# Patient Record
Sex: MALE | Race: ASIAN | NOT HISPANIC OR LATINO | URBAN - METROPOLITAN AREA
[De-identification: names, ages, dates, MRNs, and addresses within clinical notes are randomized per-mention and may not be internally consistent; named-entity substitution may affect disease eponyms.]

---

## 2019-03-23 ENCOUNTER — INPATIENT (INPATIENT)
Facility: HOSPITAL | Age: 64
LOS: 2 days | Discharge: HOME | End: 2019-03-26
Attending: HOSPITALIST | Admitting: HOSPITALIST
Payer: MEDICAID

## 2019-03-23 VITALS
TEMPERATURE: 99 F | SYSTOLIC BLOOD PRESSURE: 157 MMHG | HEART RATE: 86 BPM | OXYGEN SATURATION: 100 % | DIASTOLIC BLOOD PRESSURE: 90 MMHG | RESPIRATION RATE: 16 BRPM

## 2019-03-23 DIAGNOSIS — Z79.899 OTHER LONG TERM (CURRENT) DRUG THERAPY: ICD-10-CM

## 2019-03-23 DIAGNOSIS — R74.0 NONSPECIFIC ELEVATION OF LEVELS OF TRANSAMINASE AND LACTIC ACID DEHYDROGENASE [LDH]: ICD-10-CM

## 2019-03-23 DIAGNOSIS — M54.9 DORSALGIA, UNSPECIFIED: ICD-10-CM

## 2019-03-23 DIAGNOSIS — E83.52 HYPERCALCEMIA: ICD-10-CM

## 2019-03-23 DIAGNOSIS — I10 ESSENTIAL (PRIMARY) HYPERTENSION: ICD-10-CM

## 2019-03-23 DIAGNOSIS — Z29.9 ENCOUNTER FOR PROPHYLACTIC MEASURES, UNSPECIFIED: ICD-10-CM

## 2019-03-23 DIAGNOSIS — E78.5 HYPERLIPIDEMIA, UNSPECIFIED: ICD-10-CM

## 2019-03-23 DIAGNOSIS — E11.9 TYPE 2 DIABETES MELLITUS WITHOUT COMPLICATIONS: ICD-10-CM

## 2019-03-23 LAB
ALBUMIN SERPL ELPH-MCNC: 4.2 G/DL — SIGNIFICANT CHANGE UP (ref 3.3–5)
ALP SERPL-CCNC: 59 U/L — SIGNIFICANT CHANGE UP (ref 40–120)
ALT FLD-CCNC: 46 U/L — HIGH (ref 4–41)
ANION GAP SERPL CALC-SCNC: 14 MMO/L — SIGNIFICANT CHANGE UP (ref 7–14)
APPEARANCE UR: CLEAR — SIGNIFICANT CHANGE UP
AST SERPL-CCNC: 29 U/L — SIGNIFICANT CHANGE UP (ref 4–40)
BASOPHILS # BLD AUTO: 0.03 K/UL — SIGNIFICANT CHANGE UP (ref 0–0.2)
BASOPHILS NFR BLD AUTO: 0.4 % — SIGNIFICANT CHANGE UP (ref 0–2)
BILIRUB SERPL-MCNC: 0.2 MG/DL — SIGNIFICANT CHANGE UP (ref 0.2–1.2)
BILIRUB UR-MCNC: NEGATIVE — SIGNIFICANT CHANGE UP
BLOOD UR QL VISUAL: NEGATIVE — SIGNIFICANT CHANGE UP
BUN SERPL-MCNC: 18 MG/DL — SIGNIFICANT CHANGE UP (ref 7–23)
CALCIUM SERPL-MCNC: 10.9 MG/DL — HIGH (ref 8.4–10.5)
CHLORIDE SERPL-SCNC: 101 MMOL/L — SIGNIFICANT CHANGE UP (ref 98–107)
CO2 SERPL-SCNC: 22 MMOL/L — SIGNIFICANT CHANGE UP (ref 22–31)
COLOR SPEC: SIGNIFICANT CHANGE UP
CREAT SERPL-MCNC: 1.02 MG/DL — SIGNIFICANT CHANGE UP (ref 0.5–1.3)
EOSINOPHIL # BLD AUTO: 0.16 K/UL — SIGNIFICANT CHANGE UP (ref 0–0.5)
EOSINOPHIL NFR BLD AUTO: 2.3 % — SIGNIFICANT CHANGE UP (ref 0–6)
GLUCOSE BLDC GLUCOMTR-MCNC: 180 MG/DL — HIGH (ref 70–99)
GLUCOSE SERPL-MCNC: 185 MG/DL — HIGH (ref 70–99)
GLUCOSE UR-MCNC: >1000 — HIGH
HCT VFR BLD CALC: 44.6 % — SIGNIFICANT CHANGE UP (ref 39–50)
HGB BLD-MCNC: 14.1 G/DL — SIGNIFICANT CHANGE UP (ref 13–17)
IMM GRANULOCYTES NFR BLD AUTO: 0.1 % — SIGNIFICANT CHANGE UP (ref 0–1.5)
KETONES UR-MCNC: NEGATIVE — SIGNIFICANT CHANGE UP
LEUKOCYTE ESTERASE UR-ACNC: NEGATIVE — SIGNIFICANT CHANGE UP
LYMPHOCYTES # BLD AUTO: 1.43 K/UL — SIGNIFICANT CHANGE UP (ref 1–3.3)
LYMPHOCYTES # BLD AUTO: 21 % — SIGNIFICANT CHANGE UP (ref 13–44)
MCHC RBC-ENTMCNC: 29.1 PG — SIGNIFICANT CHANGE UP (ref 27–34)
MCHC RBC-ENTMCNC: 31.6 % — LOW (ref 32–36)
MCV RBC AUTO: 92.1 FL — SIGNIFICANT CHANGE UP (ref 80–100)
MONOCYTES # BLD AUTO: 0.58 K/UL — SIGNIFICANT CHANGE UP (ref 0–0.9)
MONOCYTES NFR BLD AUTO: 8.5 % — SIGNIFICANT CHANGE UP (ref 2–14)
NEUTROPHILS # BLD AUTO: 4.6 K/UL — SIGNIFICANT CHANGE UP (ref 1.8–7.4)
NEUTROPHILS NFR BLD AUTO: 67.7 % — SIGNIFICANT CHANGE UP (ref 43–77)
NITRITE UR-MCNC: NEGATIVE — SIGNIFICANT CHANGE UP
NRBC # FLD: 0 K/UL — LOW (ref 25–125)
PH UR: 7 — SIGNIFICANT CHANGE UP (ref 5–8)
PLATELET # BLD AUTO: 297 K/UL — SIGNIFICANT CHANGE UP (ref 150–400)
PMV BLD: 11.2 FL — SIGNIFICANT CHANGE UP (ref 7–13)
POTASSIUM SERPL-MCNC: 4 MMOL/L — SIGNIFICANT CHANGE UP (ref 3.5–5.3)
POTASSIUM SERPL-SCNC: 4 MMOL/L — SIGNIFICANT CHANGE UP (ref 3.5–5.3)
PROT SERPL-MCNC: 7 G/DL — SIGNIFICANT CHANGE UP (ref 6–8.3)
PROT UR-MCNC: NEGATIVE — SIGNIFICANT CHANGE UP
RBC # BLD: 4.84 M/UL — SIGNIFICANT CHANGE UP (ref 4.2–5.8)
RBC # FLD: 12.9 % — SIGNIFICANT CHANGE UP (ref 10.3–14.5)
SODIUM SERPL-SCNC: 137 MMOL/L — SIGNIFICANT CHANGE UP (ref 135–145)
SP GR SPEC: 1.02 — SIGNIFICANT CHANGE UP (ref 1–1.04)
UROBILINOGEN FLD QL: NORMAL — SIGNIFICANT CHANGE UP
WBC # BLD: 6.81 K/UL — SIGNIFICANT CHANGE UP (ref 3.8–10.5)
WBC # FLD AUTO: 6.81 K/UL — SIGNIFICANT CHANGE UP (ref 3.8–10.5)

## 2019-03-23 PROCEDURE — 72131 CT LUMBAR SPINE W/O DYE: CPT | Mod: 26

## 2019-03-23 PROCEDURE — 76376 3D RENDER W/INTRP POSTPROCES: CPT | Mod: 26

## 2019-03-23 PROCEDURE — 72192 CT PELVIS W/O DYE: CPT | Mod: 26

## 2019-03-23 RX ORDER — MORPHINE SULFATE 50 MG/1
2 CAPSULE, EXTENDED RELEASE ORAL EVERY 6 HOURS
Qty: 0 | Refills: 0 | Status: DISCONTINUED | OUTPATIENT
Start: 2019-03-23 | End: 2019-03-24

## 2019-03-23 RX ORDER — ACETAMINOPHEN 500 MG
975 TABLET ORAL ONCE
Qty: 0 | Refills: 0 | Status: COMPLETED | OUTPATIENT
Start: 2019-03-23 | End: 2019-03-23

## 2019-03-23 RX ORDER — LOSARTAN POTASSIUM 100 MG/1
50 TABLET, FILM COATED ORAL DAILY
Qty: 0 | Refills: 0 | Status: DISCONTINUED | OUTPATIENT
Start: 2019-03-23 | End: 2019-03-25

## 2019-03-23 RX ORDER — KETOROLAC TROMETHAMINE 30 MG/ML
60 SYRINGE (ML) INJECTION ONCE
Qty: 0 | Refills: 0 | Status: DISCONTINUED | OUTPATIENT
Start: 2019-03-23 | End: 2019-03-23

## 2019-03-23 RX ORDER — MORPHINE SULFATE 50 MG/1
4 CAPSULE, EXTENDED RELEASE ORAL EVERY 6 HOURS
Qty: 0 | Refills: 0 | Status: DISCONTINUED | OUTPATIENT
Start: 2019-03-23 | End: 2019-03-24

## 2019-03-23 RX ORDER — DEXTROSE 50 % IN WATER 50 %
12.5 SYRINGE (ML) INTRAVENOUS ONCE
Qty: 0 | Refills: 0 | Status: DISCONTINUED | OUTPATIENT
Start: 2019-03-23 | End: 2019-03-26

## 2019-03-23 RX ORDER — SODIUM CHLORIDE 9 MG/ML
1000 INJECTION, SOLUTION INTRAVENOUS
Qty: 0 | Refills: 0 | Status: DISCONTINUED | OUTPATIENT
Start: 2019-03-23 | End: 2019-03-25

## 2019-03-23 RX ORDER — DEXTROSE 50 % IN WATER 50 %
15 SYRINGE (ML) INTRAVENOUS ONCE
Qty: 0 | Refills: 0 | Status: DISCONTINUED | OUTPATIENT
Start: 2019-03-23 | End: 2019-03-26

## 2019-03-23 RX ORDER — LIDOCAINE 4 G/100G
1 CREAM TOPICAL ONCE
Qty: 0 | Refills: 0 | Status: COMPLETED | OUTPATIENT
Start: 2019-03-23 | End: 2019-03-23

## 2019-03-23 RX ORDER — ENOXAPARIN SODIUM 100 MG/ML
40 INJECTION SUBCUTANEOUS DAILY
Qty: 0 | Refills: 0 | Status: DISCONTINUED | OUTPATIENT
Start: 2019-03-23 | End: 2019-03-23

## 2019-03-23 RX ORDER — INSULIN LISPRO 100/ML
VIAL (ML) SUBCUTANEOUS
Qty: 0 | Refills: 0 | Status: DISCONTINUED | OUTPATIENT
Start: 2019-03-23 | End: 2019-03-26

## 2019-03-23 RX ORDER — INSULIN LISPRO 100/ML
VIAL (ML) SUBCUTANEOUS AT BEDTIME
Qty: 0 | Refills: 0 | Status: DISCONTINUED | OUTPATIENT
Start: 2019-03-23 | End: 2019-03-26

## 2019-03-23 RX ORDER — MORPHINE SULFATE 50 MG/1
4 CAPSULE, EXTENDED RELEASE ORAL ONCE
Qty: 0 | Refills: 0 | Status: DISCONTINUED | OUTPATIENT
Start: 2019-03-23 | End: 2019-03-23

## 2019-03-23 RX ORDER — MORPHINE SULFATE 50 MG/1
2 CAPSULE, EXTENDED RELEASE ORAL ONCE
Qty: 0 | Refills: 0 | Status: DISCONTINUED | OUTPATIENT
Start: 2019-03-23 | End: 2019-03-23

## 2019-03-23 RX ORDER — GLUCAGON INJECTION, SOLUTION 0.5 MG/.1ML
1 INJECTION, SOLUTION SUBCUTANEOUS ONCE
Qty: 0 | Refills: 0 | Status: DISCONTINUED | OUTPATIENT
Start: 2019-03-23 | End: 2019-03-26

## 2019-03-23 RX ORDER — DEXTROSE 50 % IN WATER 50 %
25 SYRINGE (ML) INTRAVENOUS ONCE
Qty: 0 | Refills: 0 | Status: DISCONTINUED | OUTPATIENT
Start: 2019-03-23 | End: 2019-03-26

## 2019-03-23 RX ADMIN — MORPHINE SULFATE 4 MILLIGRAM(S): 50 CAPSULE, EXTENDED RELEASE ORAL at 19:41

## 2019-03-23 RX ADMIN — MORPHINE SULFATE 2 MILLIGRAM(S): 50 CAPSULE, EXTENDED RELEASE ORAL at 22:22

## 2019-03-23 RX ADMIN — MORPHINE SULFATE 2 MILLIGRAM(S): 50 CAPSULE, EXTENDED RELEASE ORAL at 22:40

## 2019-03-23 RX ADMIN — LIDOCAINE 1 PATCH: 4 CREAM TOPICAL at 14:28

## 2019-03-23 RX ADMIN — Medication 60 MILLIGRAM(S): at 14:28

## 2019-03-23 RX ADMIN — Medication 975 MILLIGRAM(S): at 14:28

## 2019-03-23 NOTE — ED PROVIDER NOTE - OBJECTIVE STATEMENT
62 yo M hx HTN, HLD, DM2, sciatica, presenting with acute on chronic back and leg pain. Mechanial fall one week ago, while crossing the street, no LOC. 64 yo M hx HTN, HLD, DM2, sciatica, presenting with acute on chronic back and leg pain. Mechanial fall one week ago, while crossing the street, no LOC. Fell onto L knee and then onto backside. Did not hit head. Unable to sit or walk 2/2 pain. Denies bowel/bladder incontinence. no urinary retention. Taking Tylenol once a day without relief. took his wife's 100 mg gabapentin twice yesterday without relief.

## 2019-03-23 NOTE — H&P ADULT - NSHPLABSRESULTS_GEN_ALL_CORE
14.1   6.81  )-----------( 297      ( 23 Mar 2019 19:07 )             44.6           137  |  101  |  18  ----------------------------<  185<H>  4.0   |  22  |  1.02    Ca    10.9<H>      23 Mar 2019 19:07    TPro  7.0  /  Alb  4.2  /  TBili  0.2  /  DBili  x   /  AST  29  /  ALT  46<H>  /  AlkPhos  59                Urinalysis Basic - ( 23 Mar 2019 18:54 )    Color: LIGHT YELLOW / Appearance: CLEAR / S.024 / pH: 7.0  Gluc: >1000 / Ketone: NEGATIVE  / Bili: NEGATIVE / Urobili: NORMAL   Blood: NEGATIVE / Protein: NEGATIVE / Nitrite: NEGATIVE   Leuk Esterase: NEGATIVE / RBC: x / WBC x   Sq Epi: x / Non Sq Epi: x / Bacteria: x      < from: CT Lumbar Spine No Cont (19 @ 16:04) >      FINDINGS:      VERTEBRAL BODIES AND DISCS: No acute fracture. There are multilevel   degenerative changes characterized by disc bulges, ligament hypertrophy,   and facet and uncinate hypertrophy. This results in multilevel spinal   canal stenosis and multilevel neural foraminal narrowing, the overall   degree of which is not well demonstrated on this study. Anterior   osteophytes are noted.    ALIGNMENT:  No subluxations.    SPINAL CANAL:  No other intradural or extradural defects are seen.     MISCELLANEOUS:  Nonspecific distention of the urinary bladder is   partially visualized. Correlate for lack of recent micturition, bladder   outlet obstruction, or neurogenic bladder. Aortic calcifications.      IMPRESSION:     No acute fracture. Multilevel degenerative changes, as above. If the   patient has persistent back pain, consider lumbar spine MRI imaging   follow-up.    Nonspecific distention of the urinary bladder is partially visualized.   Correlate for lack of recent micturition, bladder outlet obstruction, or   neurogenic bladder    < end of copied text >    < from: CT Pelvis Bony Only No Cont (19 @ 16:04) >    BONE: No acute fracture or dislocation. Extensive enthesopathy about the   pelvis and bilateral greater trochanters. Partial ankylosis of the   bilateral SI joints. Multilevel spondylosis and facet arthropathy in the   lower lumbar spine. Degenerative change of the pubic symphysis. Bilateral   hip cartilage space narrowing with large marginal osteophyte formation.   No focal lytic orblastic lesions.    SOFT TISSUE: Neurovascular structures are normal in course and caliber.   Intrapelvic organs are within normal limits. No pelvic free fluid or   lymphadenopathy. Left-sided fat-containing inguinal hernia. Muscle bulk   is symmetric and maintained. No hip joint effusions.    IMPRESSION:  1.  No acute fracture or dislocation.  2.  Osteoarthritis of the bilateral hips.  3.  Extensive enthesopathy about the pelvis and partial ankylosis of the   bilateral SI joints, raising the possibility of ankylosing spondylitis.              < end of copied text > 14.1   6.81  )-----------( 297      ( 23 Mar 2019 19:07 )             44.6         137  |  101  |  18  ----------------------------<  185<H>  4.0   |  22  |  1.02    Ca    10.9<H>      23 Mar 2019 19:07    TPro  7.0  /  Alb  4.2  /  TBili  0.2  /  DBili  x   /  AST  29  /  ALT  46<H>  /  AlkPhos  59          Urinalysis Basic - ( 23 Mar 2019 18:54 )  Color: LIGHT YELLOW / Appearance: CLEAR / S.024 / pH: 7.0  Gluc: >1000 / Ketone: NEGATIVE  / Bili: NEGATIVE / Urobili: NORMAL   Blood: NEGATIVE / Protein: NEGATIVE / Nitrite: NEGATIVE   Leuk Esterase: NEGATIVE / RBC: x / WBC x   Sq Epi: x / Non Sq Epi: x / Bacteria: x    < from: CT Lumbar Spine No Cont (19 @ 16:04) >  FINDINGS:    VERTEBRAL BODIES AND DISCS: No acute fracture. There are multilevel degenerative changes characterized by disc bulges, ligament hypertrophy, and facet and uncinate hypertrophy. This results in multilevel spinal canal stenosis and multilevel neural foraminal narrowing, the overall degree of which is not well demonstrated on this study. Anterior osteophytes are noted.  ALIGNMENT:  No subluxations.  SPINAL CANAL:  No other intradural or extradural defects are seen.   MISCELLANEOUS:  Nonspecific distention of the urinary bladder is partially visualized. Correlate for lack of recent micturition, bladder   outlet obstruction, or neurogenic bladder. Aortic calcifications.  IMPRESSION: No acute fracture. Multilevel degenerative changes, as above. If the patient has persistent back pain, consider lumbar spine MRI imaging follow-up. Nonspecific distention of the urinary bladder is partially visualized. Correlate for lack of recent micturition, bladder outlet obstruction, or neurogenic bladder   < end of copied text >    < from: CT Pelvis Bony Only No Cont (19 @ 16:04) >  BONE: No acute fracture or dislocation. Extensive enthesopathy about the pelvis and bilateral greater trochanters. Partial ankylosis of the   bilateral SI joints. Multilevel spondylosis and facet arthropathy in the lower lumbar spine. Degenerative change of the pubic symphysis. Bilateral   hip cartilage space narrowing with large marginal osteophyte formation. No focal lytic or blastic lesions.  SOFT TISSUE: Neurovascular structures are normal in course and caliber. Intrapelvic organs are within normal limits. No pelvic free fluid or   lymphadenopathy. Left-sided fat-containing inguinal hernia. Muscle bulk is symmetric and maintained. No hip joint effusions.  IMPRESSION: 1.  No acute fracture or dislocation. 2.  Osteoarthritis of the bilateral hips. 3.  Extensive enthesopathy about the pelvis and partial ankylosis of the bilateral SI joints, raising the possibility of ankylosing spondylitis.   < end of copied text >

## 2019-03-23 NOTE — ED ADULT NURSE REASSESSMENT NOTE - REASSESS COMMUNICATION
bed in 313b, floor called [id number]
MAR and floor resident made aware of vs and pain. will medicate prior to sending pt to floor as ordered for pain/ED physician notified

## 2019-03-23 NOTE — H&P ADULT - ATTENDING COMMENTS
Agree with resident H&P and plan as edited above. Patient reports to me similar pain for >1mo, has been to PT without relief of symptoms as well as  having outpatient imaging ?MRI (wife will bring in report in AM) and has had an injection in the lower back approximately 1 month ago.  Patient reports mechanical fall 1 week ago without trauma to back/head trauma or LOC (fell on knees with notable healing abrasion to b/l knees).  Pain to palpation of sacral spine on my exam, patient with full ROM b/l legs, (+)straight leg raise and strength in LLE limited secondary to pain.  Denies bowel/bladder incontinence or saddle anesthesia. Reports passing urine since earlier imaging studies, bladder does not feel distended on my exam. Would benefit from clarification of outpatient w/u and treatment thus far.  Per OMR review patient received Cyclobenzaprine 10mg Rx on 3/19 and also received Rx in 1/2019 which would be consistent with more of an acute on chronic process. Will add on HLAB-27 to AM labs.  F/u inflammatory markers.  May benefit from trial of steroids. Agree with resident H&P and plan as edited above. Patient reports to me similar pain for >1mo, has been to PT without relief of symptoms as well as  having outpatient imaging ?MRI (wife will bring in report in AM) and has had an injection in the lower back approximately 1 month ago.  Patient reports mechanical fall 1 week ago without trauma to back/head trauma or LOC (fell on knees with notable healing abrasion to b/l knees).  Pain to palpation of sacral spine on my exam, patient with full ROM b/l legs, (+)straight leg raise and strength in LLE limited secondary to pain.  Reports dorsal numbness lateral 3 toes on L that comes/goes for also >1mo.  Denies bowel/bladder incontinence or saddle anesthesia. Reports passing urine since earlier imaging studies, bladder does not feel distended on my exam. Would benefit from clarification of outpatient w/u and treatment thus far.  Per OMR review patient received Cyclobenzaprine 10mg Rx on 3/19 and also received Rx in 1/2019 which would be consistent with more of an acute on chronic process. Will add on HLAB-27 to AM labs.  F/u inflammatory markers.  May benefit from trial of steroids.

## 2019-03-23 NOTE — H&P ADULT - ASSESSMENT
64 y/o Male w/ a pmh significant for HTN, DM2, HLD, and sciatica admitted for management of lower back pain

## 2019-03-23 NOTE — H&P ADULT - PROBLEM SELECTOR PLAN 5
- DVT PPx: Lovenox 40mg QD  - Diet: DASH  - PT consult - Pt unclear of full med regimen-please clarify with pharmacy in AM - Ca 10.9 on admission (albumin 4.2) of unclear etiology   - Will repeat in AM and reassess. If persistently elevated or rising can consider checking PTH levels  - No lytic lesions on CT or renal dysfunction

## 2019-03-23 NOTE — ED ADULT NURSE REASSESSMENT NOTE - SYMPTOMS
pt continues to c.o. lower back pain radiating down lt leg 8/10. + numbness and tingling in foot which is not new. vs as documented

## 2019-03-23 NOTE — H&P ADULT - PROBLEM SELECTOR PLAN 6
- DVT PPx: Lovenox 40mg QD  - Diet: DASH  - PT consult - ALT 46 likely in the setting of recent Tylenol use for lower back pain  - Can repeat CMP and reevaluate in AM.

## 2019-03-23 NOTE — ED PROVIDER NOTE - NS ED ROS FT
CONST: no fevers, no chills, no trauma  EYES: no pain, no visual disturbances  ENT: no sore throat, no epistaxis, no rhinorrhea, no hearing changes  CV: no chest pain, no palpitations, no orthopnea, no extremity pain or swelling  RESP: no shortness of breath, no cough, no sputum, no pleurisy, no wheezing  ABD: no abdominal pain, no nausea, no vomiting, no diarrhea, no black or bloody stool  : no dysuria, no hematuria, no frequency, no urgency  MSK: + back pain, no neck pain, + extremity pain  NEURO: no headache, no sensory disturbances, no focal weakness, no dizziness  HEME: no easy bleeding or bruising  SKIN: no diaphoresis, no rash

## 2019-03-23 NOTE — H&P ADULT - PROBLEM SELECTOR PLAN 1
- CT L spine shows no evidence of acute fracture but multilevele degenerative changes  - CT Pelvis w/ extensive enthesopathy - CT L spine shows no evidence of acute fracture but multilevel degenerative changes. CT Pelvis w/ extensive enthesopathy and partial ankylosis of the b/l SI joints ? ankylosing spondylitis  - + straight leg test but no bladder/bowel incontinence or evolving neuro deficits at this time.   - MRI L spine pending to r/o cord compression.   - Suspect lower back pain is due to the extensive enthesopathy and partial ankylosis of the b/l SI joints c/b recent mechanical fall and known OA of b/l hips  - Frequent neuro checks. f/u MRI read  - ESR and CRP pending in AM  - PT consult  - c/w NSAIDs for mild pain. Morphine for severe pain. - CT L spine shows no evidence of acute fracture but multilevel degenerative changes. CT Pelvis w/ extensive enthesopathy and partial ankylosis of the b/l SI joints ? ankylosing spondylitis. No evidence of any extra-articular signs such as IBD, uveitis, psoriasis etc. but he does report improvement w/ ambulation and pain at night. ESR and CRP pending in AM  - + straight leg test but no bladder/bowel incontinence or evolving neuro deficits at this time.   - MRI L spine pending to r/o cord compression.   - Suspect lower back pain is due to the extensive enthesopathy and partial ankylosis of the b/l SI joints c/b recent mechanical fall and known OA of b/l hips  - Frequent neuro checks. f/u MRI read  - PT consult  - Naproxen 500mg PO q6h prn mild pain. Morphine 2mg/4mg IV q6h for moderate /severe pain. - CT L spine shows no evidence of acute fracture but multilevel degenerative changes. CT Pelvis w/ extensive enthesopathy and partial ankylosis of the b/l SI joints ? ankylosing spondylitis. No evidence of any extra-articular signs such as IBD, uveitis, psoriasis etc. but he does report improvement w/ ambulation and pain at night. ESR and CRP pending in AM  - + straight leg test but no bladder/bowel incontinence or evolving neuro deficits at this time.   - MRI L spine pending to r/o cord compression.   - Suspect lower back pain is due to the extensive enthesopathy and partial ankylosis of the b/l SI joints c/b recent mechanical fall and known OA of b/l hips  - Frequent neuro checks. f/u MRI read- will hold NSAIDs and Lovenox if pt needs possible NSGY intervention. Restart if no intervention required.   - PT consult  - Morphine 2mg/4mg IV q6h for moderate /severe pain. severe acute on chronic lower back pain with L-sided sciatica, suspect nerve impingement   - CT L spine shows no evidence of acute fracture but multilevel degenerative changes. CT Pelvis w/ extensive enthesopathy and partial ankylosis of the b/l SI joints ? ankylosing spondylitis. No evidence of any extra-articular signs such as IBD, uveitis, psoriasis etc. but he does report improvement w/ ambulation and pain at night. ESR and CRP pending in AM  - + straight leg test but no bladder/bowel incontinence or evolving neuro deficits at this time.   - MRI L spine pending to r/o cord compression, low suspicion   - Suspect lower back pain is due to the extensive enthesopathy and partial ankylosis of the b/l SI joints c/b recent mechanical fall and known OA of b/l hips  - Frequent neuro checks. f/u MRI read- will hold NSAIDs and Lovenox if pt needs possible NSGY intervention. Restart if no intervention required.   - PT consult  - Morphine 2mg/4mg IV q6h for moderate /severe pain.

## 2019-03-23 NOTE — H&P ADULT - HISTORY OF PRESENT ILLNESS
64 y/o Male w/ a pmh significant for HTN, DM2, HLD, and sciatica presenting to the ED w/ a chief complaint of lower back pain.         In th ED, pt afebrile, -173/90-96, HR 86-97, RR 18 (97% RA). Labs notable for Ca 10.9, ALT 46. CT L spine shows no acute fracture w/ multilevel degenerative changes.. CT Pelvis shows Extensive enthesopathy about the pelvis and partial ankylosis of the bilateral SI joints, raising the possibility of ankylosing spondylitis. Pt given Toradol 60mg IM x1, Lidocaine patch x1, Morphine 4mg x1, and Tylenol 975mg x1,. Admitted to medicine for further management. Pt electing for family member at bedside to translate for him.     62 y/o Male w/ a pmh significant for HTN, DM2, HLD, and sciatica presenting to the ED w/ a chief complaint of lower back pain. Pt has been experiencing severe constant, dull, aching mid lower back pain radiating down his LLE for the last 3 weeks. He had one reported mechanical fall last week while crossing the street where he fell on his back side but denies any LOC, head trauma, CP, palpitations or SOB prior to the fall. He says his back pain has been progressively worsening since this fall w/ a peak in his pain this AM. He has been unable to stand up on his own without the help of multiple family members assisting him and despite this complains of severe lower back pain. He noticed his back pain improves with walking and is alleviated with hot packs. His pain is worse while sitting upright, sharp movements, and in the AM. + numbness of his LLE intermittently over the last 3 weeks. He denies fevers, chills, n/v, CP, palpitations, SOB, abdominal pain, bladder/bowel incontinence.    In th ED, pt afebrile, -173/90-96, HR 86-97, RR 18 (97% RA). Labs notable for Ca 10.9, ALT 46. CT L spine shows no acute fracture w/ multilevel degenerative changes.. CT Pelvis shows Extensive enthesopathy about the pelvis and partial ankylosis of the bilateral SI joints, raising the possibility of ankylosing spondylitis. Pt given Toradol 60mg IM x1, Lidocaine patch x1, Morphine 4mg x1, and Tylenol 975mg x1,. Admitted to medicine for further management. Pt electing for family member at bedside to translate for him.     64 y/o Male w/ a pmh significant for HTN, DM2, HLD, and sciatica presenting to the ED w/ a chief complaint of lower back pain. Pt has been experiencing severe constant, dull, aching mid lower back pain radiating down his LLE for the last 3 weeks. He had one reported mechanical fall last week while crossing the street where he fell on his back side but denies any LOC, head trauma, CP, palpitations or SOB prior to the fall. He says his back pain has been progressively worsening since this fall w/ a peak in his pain this AM. He has been unable to stand up on his own without the help of multiple family members assisting him and despite this complains of severe lower back pain. He noticed his back pain improves with walking and is alleviated with hot packs. His pain is worse while sitting upright, sharp movements, and in the PM. + numbness of his LLE intermittently over the last 3 weeks. He denies fevers, chills, n/v, CP, palpitations, SOB, abdominal pain, bladder/bowel incontinence.    In th ED, pt afebrile, -173/90-96, HR 86-97, RR 18 (97% RA). Labs notable for Ca 10.9, ALT 46. CT L spine shows no acute fracture w/ multilevel degenerative changes.. CT Pelvis shows Extensive enthesopathy about the pelvis and partial ankylosis of the bilateral SI joints, raising the possibility of ankylosing spondylitis. Pt given Toradol 60mg IM x1, Lidocaine patch x1, Morphine 4mg x1, and Tylenol 975mg x1,. Admitted to medicine for further management. Pt electing for family member at bedside to translate for him.     62 y/o Male w/ a pmh significant for HTN, DM2, HLD, and sciatica presenting to the ED w/ a chief complaint of lower back pain. Pt has been experiencing severe constant, dull, aching mid lower back pain radiating down his LLE for the last 3 weeks. He had one reported mechanical fall last week while crossing the street where he fell on his back side but denies any LOC, head trauma, CP, palpitations or SOB prior to the fall. He says his back pain has been progressively worsening since this fall w/ a peak in his pain this AM. He has been unable to stand up on his own without the help of multiple family members assisting him and despite this complains of severe lower back pain. He noticed his back pain improves with walking and is alleviated with hot packs. His pain is worse while sitting upright, sharp movements, and in the PM. + numbness of his L dorsal foot intermittently over the last 3 weeks. He denies fevers, chills, n/v, CP, palpitations, SOB, abdominal pain, bladder/bowel incontinence or saddle anesthesia.    In th ED, pt afebrile, -173/90-96, HR 86-97, RR 18 (97% RA). Labs notable for Ca 10.9, ALT 46. CT L spine shows no acute fracture w/ multilevel degenerative changes.. CT Pelvis shows Extensive enthesopathy about the pelvis and partial ankylosis of the bilateral SI joints, raising the possibility of ankylosing spondylitis. Pt given Toradol 60mg IM x1, Lidocaine patch x1, Morphine 4mg x1, and Tylenol 975mg x1,. Admitted to medicine for further management.

## 2019-03-23 NOTE — ED PROVIDER NOTE - ATTENDING CONTRIBUTION TO CARE
I performed a face to face bedside interview with patient regarding history of present illness, review of symptoms and past medical history. I completed an independent physical exam.  I have discussed patient's plan of care.   I agree with note as stated above, having amended the EMR as needed to reflect my findings. I have discussed the assessment and plan of care.  This includes during the time I functioned as the attending physician for this patient.  Attending Contribution to Care: agree with plan of resident.  presenting with acute on chronic back and leg pain. Mechanial fall one week ago, while crossing the street, no LOC. Fell onto L knee and then onto backside. Did not hit head. pt unable to ambulate. will require admission for mri, PT eval.

## 2019-03-23 NOTE — ED PROVIDER NOTE - PHYSICAL EXAMINATION
VITALS: reviewed  GEN: NAD, A & O x 4  HEAD/EYES: NCAT, PERRL, EOMI, anicteric sclerae, no conjunctival pallor  ENT: mucus membranes moist, oropharynx WNL, trachea midline, no JVD  RESP: lungs CTA with equal breath sounds bilaterally, chest wall nontender and atraumatic  CV: heart with reg rhythm S1, S2, no murmur; distal pulses intact and symmetric bilaterally  ABDOMEN: normoactive bowel sounds, soft, nondistended, nontender, no palpable masses  : no CVAT  MSK: extremities atraumatic and nontender, no edema, no asymmetry. the back is with midline lumbar tenderness, there is no spinal deformity, tenderness with back flexion. + SLR on R. the neck has no midline tenderness, deformity, or stepoff, and is ranged painlessly.  SKIN: warm, dry, no rash, no bruising, no cyanosis. color appropriate for ethnicity  NEURO: alert, mentating appropriately, no facial asymmetry. gross sensation, motor, coordination are intact  PSYCH: Affect appropriate

## 2019-03-23 NOTE — H&P ADULT - PROBLEM SELECTOR PLAN 2
- Unknown last A1c. Home regimen consists of Metformin 500mg PO BID and Janumet (unclear of dosage, clarify in AM)  - ISS  - Monitor FSGs  - A1c in AM

## 2019-03-23 NOTE — ED ADULT TRIAGE NOTE - CHIEF COMPLAINT QUOTE
arrives with EMS from home for lower back pain with radiation down left leg, 1 month ago was dx with nerve damage and sciatica, with surgery recommended.  (+) fall 1 week ago, no LOC. arrives with EMS from home for lower back pain with radiation down left leg, 1 month ago was dx with nerve damage and sciatica, with surgery recommended.  (+) fall 1 week ago, no LOC.  As per EMS, ambulatory on scene.

## 2019-03-23 NOTE — H&P ADULT - PROBLEM SELECTOR PLAN 3
- Home regimen of Losartan 50mg PO QD  - Hypertensive on presentation likely due to pain  - c/w Losartan 50mg PO QD  - Hold for SBP <90 - Home regimen of Losartan PO QD (clarify dose in AM) and amlodipine   - Hypertensive on presentation likely due to pain  - c/w Losartan QD  - Hold for SBP <90

## 2019-03-23 NOTE — H&P ADULT - NSHPPHYSICALEXAM_GEN_ALL_CORE
PHYSICAL EXAM:    General: No acute distress.  HEENT: Normocephalic, atraumatic.  PERRL.  EOMI.  No scleral icterus  Heart: RRR.  Normal S1 and S2.  No murmurs, rubs, or gallops.   Lungs: CTAB. No wheezes, crackles, or rhonchi.    Abdomen: BS+, soft, NT/ND.  No organomegaly.  Skin: Warm and dry.  No rashes.  Extremities: No edema, clubbing, or cyanosis.  2+ peripheral pulses b/l.  Musculoskeletal: No deformities.  No spinal or paraspinal tenderness.  Neuro: A&Ox3.  CN II-XII intact.  5/5 strength in UE and LE b/l. PHYSICAL EXAM:    General: Moderate distress, visibly uncomfortable lying in bed  HEENT: Normocephalic, atraumatic.  PERRL.  EOMI.  No scleral icterus  Heart: RRR.  Normal S1 and S2.  No murmurs, rubs, or gallops.   Lungs: CTAB. No wheezes, crackles, or rhonchi.    Abdomen: BS+, soft, NT/ND.  No organomegaly.  Skin: Warm and dry.  No rashes.  Extremities: No edema, clubbing, or cyanosis.  2+ peripheral pulses b/l.  Musculoskeletal: Tenderness to palpation from mid thoracic spine to L spine.   Neuro: A&Ox3.  CN II-XII intact.  + straight leg test LLE. LLE 3/5 strength (limited by pain) w/ decreased sensation on plantar surface of L foot. RLE 5/5 strength- sensation intat. 2+ reflexes b/l LE PHYSICAL EXAM:    General: Moderate distress, visibly uncomfortable lying in bed  HEENT: Normocephalic, atraumatic.  PERRL.  EOMI.  No scleral icterus  Heart: RRR.  Normal S1 and S2.  No murmurs, rubs, or gallops.   Lungs: CTAB. No wheezes, crackles, or rhonchi.    Abdomen: BS+, soft, NT/ND.  No organomegaly.  Skin: Warm and dry.  No rashes.  Extremities: No edema, clubbing, or cyanosis.  2+ peripheral pulses b/l.  Musculoskeletal: Tenderness to palpation from mid thoracic spine to L spine.   Neuro: A&Ox3.  CN II-XII intact.  + straight leg test LLE. LLE 3/5 strength (limited by pain) w/ decreased sensation on plantar surface of L foot. RLE 5/5 strength- sensation intat. 2+ reflexes b/l LE  ALBIN: Deferred PHYSICAL EXAM:    General: Moderate distress, visibly uncomfortable lying in bed  HEENT: Normocephalic, atraumatic.  PERRL.  EOMI.  No scleral icterus  Heart: RRR.  Normal S1 and S2.  No murmurs, rubs, or gallops.   Lungs: CTAB. No wheezes, crackles, or rhonchi.    Abdomen: BS+, soft, NT/ND.  No organomegaly.  Skin: Warm and dry.  No rashes.  Extremities: No edema, clubbing, or cyanosis.  2+ peripheral pulses b/l.  Musculoskeletal: Tenderness to palpation from mid thoracic spine to L spine.   Neuro: A&Ox3.  CN II-XII intact.  + straight leg test LLE. LLE 3/5 strength (limited by pain) w/ decreased sensation on plantar surface of L foot. RLE 5/5 strength- sensation intat. 2+ reflexes b/l LE [attending exam - reported decreased sensation to light touch of dorsal L foot, reported sensation intact on plantar L foot]  ALBIN: Deferred

## 2019-03-23 NOTE — ED ADULT NURSE NOTE - CHIEF COMPLAINT QUOTE
arrives with EMS from home for lower back pain with radiation down left leg, 1 month ago was dx with nerve damage and sciatica, with surgery recommended.  (+) fall 1 week ago, no LOC.  As per EMS, ambulatory on scene.

## 2019-03-23 NOTE — H&P ADULT - NSHPREVIEWOFSYSTEMS_GEN_ALL_CORE
REVIEW OF SYSTEMS:    CONSTITUTIONAL: Denies any fatigue, weakness, fevers or chills  EYES/ENT: No visual changes;  No vertigo or throat pain   NECK: No pain or stiffness  RESPIRATORY: No cough, wheezing, hemoptysis; No shortness of breath  CARDIOVASCULAR: No chest pain or palpitations  GASTROINTESTINAL: No abdominal or epigastric pain. No nausea, vomiting, or hematemesis; No diarrhea or constipation. No melena or hematochezia.  GENITOURINARY: No dysuria, frequency or hematuria  NEUROLOGICAL: + Lower back pain   SKIN: No itching, burning, rashes, or lesions   All other review of systems is negative unless indicated above. REVIEW OF SYSTEMS:    CONSTITUTIONAL: Denies any fatigue, weakness, fevers or chills  EYES/ENT: No visual changes;  No vertigo or throat pain   NECK: No pain or stiffness  RESPIRATORY: No cough, wheezing, hemoptysis; No shortness of breath  CARDIOVASCULAR: No chest pain or palpitations  GASTROINTESTINAL: No abdominal or epigastric pain. No nausea, vomiting, or hematemesis; No diarrhea or constipation. No melena or hematochezia.  GENITOURINARY: No dysuria, frequency or hematuria  NEUROLOGICAL: + Lower back pain + LLE numbness  SKIN: No itching, burning, rashes, or lesions   All other review of systems is negative unless indicated above.

## 2019-03-23 NOTE — H&P ADULT - PROBLEM SELECTOR PLAN 8
- DVT PPx: Lovenox 40mg QD  - Diet: DASH  - PT consult - DVT PPx: Lovenox 40mg QD if no surgical intervention   - Diet: DASH  - PT consult

## 2019-03-23 NOTE — H&P ADULT - PROBLEM SELECTOR PLAN 4
- DVT PPx: Lovenox 40mg QD  - Diet: DASH  - PT consult - Pt unclear of DM2 regimen-please clarify with pharmacy in AM - Known hx of HLD on statin but unclear of dosage  - Will clarify in AM - c/w fenofibrate

## 2019-03-23 NOTE — ED PROVIDER NOTE - CLINICAL SUMMARY MEDICAL DECISION MAKING FREE TEXT BOX
64 yo M presenting with back pain after fall. + SLR, known sciatica. Will obtain CT non con lumbosacral spine, pelvis r/o fx. Tx with analgesia. If pt can ambulate will dc with outpatient fu.

## 2019-03-24 DIAGNOSIS — M54.9 DORSALGIA, UNSPECIFIED: ICD-10-CM

## 2019-03-24 LAB
ANION GAP SERPL CALC-SCNC: 11 MMO/L — SIGNIFICANT CHANGE UP (ref 7–14)
BUN SERPL-MCNC: 23 MG/DL — SIGNIFICANT CHANGE UP (ref 7–23)
CALCIUM SERPL-MCNC: 10.1 MG/DL — SIGNIFICANT CHANGE UP (ref 8.4–10.5)
CHLORIDE SERPL-SCNC: 103 MMOL/L — SIGNIFICANT CHANGE UP (ref 98–107)
CO2 SERPL-SCNC: 21 MMOL/L — LOW (ref 22–31)
CREAT SERPL-MCNC: 1.1 MG/DL — SIGNIFICANT CHANGE UP (ref 0.5–1.3)
CRP SERPL-MCNC: < 4 MG/L — SIGNIFICANT CHANGE UP
ERYTHROCYTE [SEDIMENTATION RATE] IN BLOOD: 14 MM/HR — SIGNIFICANT CHANGE UP (ref 1–15)
GLUCOSE BLDC GLUCOMTR-MCNC: 146 MG/DL — HIGH (ref 70–99)
GLUCOSE BLDC GLUCOMTR-MCNC: 171 MG/DL — HIGH (ref 70–99)
GLUCOSE BLDC GLUCOMTR-MCNC: 239 MG/DL — HIGH (ref 70–99)
GLUCOSE SERPL-MCNC: 160 MG/DL — HIGH (ref 70–99)
HBA1C BLD-MCNC: 7.9 % — HIGH (ref 4–5.6)
HCT VFR BLD CALC: 42.1 % — SIGNIFICANT CHANGE UP (ref 39–50)
HGB BLD-MCNC: 13.3 G/DL — SIGNIFICANT CHANGE UP (ref 13–17)
MAGNESIUM SERPL-MCNC: 2.3 MG/DL — SIGNIFICANT CHANGE UP (ref 1.6–2.6)
MCHC RBC-ENTMCNC: 29.2 PG — SIGNIFICANT CHANGE UP (ref 27–34)
MCHC RBC-ENTMCNC: 31.6 % — LOW (ref 32–36)
MCV RBC AUTO: 92.5 FL — SIGNIFICANT CHANGE UP (ref 80–100)
NRBC # FLD: 0 K/UL — LOW (ref 25–125)
PHOSPHATE SERPL-MCNC: 3.5 MG/DL — SIGNIFICANT CHANGE UP (ref 2.5–4.5)
PLATELET # BLD AUTO: 275 K/UL — SIGNIFICANT CHANGE UP (ref 150–400)
PMV BLD: 10.9 FL — SIGNIFICANT CHANGE UP (ref 7–13)
POTASSIUM SERPL-MCNC: 3.7 MMOL/L — SIGNIFICANT CHANGE UP (ref 3.5–5.3)
POTASSIUM SERPL-SCNC: 3.7 MMOL/L — SIGNIFICANT CHANGE UP (ref 3.5–5.3)
RBC # BLD: 4.55 M/UL — SIGNIFICANT CHANGE UP (ref 4.2–5.8)
RBC # FLD: 13.1 % — SIGNIFICANT CHANGE UP (ref 10.3–14.5)
SODIUM SERPL-SCNC: 135 MMOL/L — SIGNIFICANT CHANGE UP (ref 135–145)
WBC # BLD: 7.65 K/UL — SIGNIFICANT CHANGE UP (ref 3.8–10.5)
WBC # FLD AUTO: 7.65 K/UL — SIGNIFICANT CHANGE UP (ref 3.8–10.5)

## 2019-03-24 PROCEDURE — 12345: CPT | Mod: NC

## 2019-03-24 PROCEDURE — 72148 MRI LUMBAR SPINE W/O DYE: CPT | Mod: 26

## 2019-03-24 PROCEDURE — 99223 1ST HOSP IP/OBS HIGH 75: CPT | Mod: GC

## 2019-03-24 RX ORDER — DEXAMETHASONE 0.5 MG/5ML
10 ELIXIR ORAL ONCE
Qty: 0 | Refills: 0 | Status: DISCONTINUED | OUTPATIENT
Start: 2019-03-24 | End: 2019-03-24

## 2019-03-24 RX ORDER — MORPHINE SULFATE 50 MG/1
4 CAPSULE, EXTENDED RELEASE ORAL EVERY 6 HOURS
Qty: 0 | Refills: 0 | Status: DISCONTINUED | OUTPATIENT
Start: 2019-03-24 | End: 2019-03-25

## 2019-03-24 RX ORDER — DEXAMETHASONE 0.5 MG/5ML
10 ELIXIR ORAL ONCE
Qty: 0 | Refills: 0 | Status: COMPLETED | OUTPATIENT
Start: 2019-03-24 | End: 2019-03-24

## 2019-03-24 RX ORDER — MORPHINE SULFATE 50 MG/1
8 CAPSULE, EXTENDED RELEASE ORAL EVERY 6 HOURS
Qty: 0 | Refills: 0 | Status: DISCONTINUED | OUTPATIENT
Start: 2019-03-24 | End: 2019-03-25

## 2019-03-24 RX ORDER — FENOFIBRATE,MICRONIZED 130 MG
145 CAPSULE ORAL DAILY
Qty: 0 | Refills: 0 | Status: DISCONTINUED | OUTPATIENT
Start: 2019-03-24 | End: 2019-03-26

## 2019-03-24 RX ORDER — DEXAMETHASONE 0.5 MG/5ML
10 ELIXIR ORAL EVERY 6 HOURS
Qty: 0 | Refills: 0 | Status: COMPLETED | OUTPATIENT
Start: 2019-03-24 | End: 2019-03-25

## 2019-03-24 RX ORDER — AMLODIPINE BESYLATE 2.5 MG/1
10 TABLET ORAL DAILY
Qty: 0 | Refills: 0 | Status: DISCONTINUED | OUTPATIENT
Start: 2019-03-24 | End: 2019-03-26

## 2019-03-24 RX ADMIN — MORPHINE SULFATE 4 MILLIGRAM(S): 50 CAPSULE, EXTENDED RELEASE ORAL at 08:55

## 2019-03-24 RX ADMIN — MORPHINE SULFATE 4 MILLIGRAM(S): 50 CAPSULE, EXTENDED RELEASE ORAL at 08:36

## 2019-03-24 RX ADMIN — LIDOCAINE 1 PATCH: 4 CREAM TOPICAL at 03:00

## 2019-03-24 RX ADMIN — Medication 102 MILLIGRAM(S): at 17:48

## 2019-03-24 RX ADMIN — Medication 4: at 17:49

## 2019-03-24 RX ADMIN — MORPHINE SULFATE 8 MILLIGRAM(S): 50 CAPSULE, EXTENDED RELEASE ORAL at 21:44

## 2019-03-24 RX ADMIN — MORPHINE SULFATE 4 MILLIGRAM(S): 50 CAPSULE, EXTENDED RELEASE ORAL at 14:20

## 2019-03-24 RX ADMIN — LOSARTAN POTASSIUM 50 MILLIGRAM(S): 100 TABLET, FILM COATED ORAL at 05:57

## 2019-03-24 RX ADMIN — MORPHINE SULFATE 4 MILLIGRAM(S): 50 CAPSULE, EXTENDED RELEASE ORAL at 14:03

## 2019-03-24 RX ADMIN — Medication 145 MILLIGRAM(S): at 14:11

## 2019-03-24 RX ADMIN — AMLODIPINE BESYLATE 10 MILLIGRAM(S): 2.5 TABLET ORAL at 05:57

## 2019-03-24 RX ADMIN — Medication 2: at 14:11

## 2019-03-24 RX ADMIN — Medication 102 MILLIGRAM(S): at 22:57

## 2019-03-24 RX ADMIN — MORPHINE SULFATE 8 MILLIGRAM(S): 50 CAPSULE, EXTENDED RELEASE ORAL at 21:29

## 2019-03-24 NOTE — PHYSICAL THERAPY INITIAL EVALUATION ADULT - PERTINENT HX OF CURRENT PROBLEM, REHAB EVAL
Patient is a 63 year old male admitted to [facility] on 3/23 with a chief complaint of lower back pain. Pt has been experiencing severe constant, dull, aching mid lower back pain radiating down his Left LE for the last 3 weeks. Patient admits to fall 1 week ago. PMH includes: HTN, DM2, HLD, and sciatica. 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

## 2019-03-24 NOTE — CONSULT NOTE ADULT - SUBJECTIVE AND OBJECTIVE BOX
Patient is a 63y Male who presents c/o low back pain that has worsened over past 10 days since fall onto floor (forward fall). pain worsened yesterday with increased difficulty with ambulation. Denies HS/LOC. His pain starts in low back adn radiates to left foot with left buttock pain and subjective weakness. He has numbness over lateral aspect of left foot. Denies bowel/bladder incontinence. Denies fevers/chills. No other complaints at this time.    HEALTH ISSUES - PROBLEM Dx:  Back pain: Back pain  Elevated transaminase level: Elevated transaminase level  Hypercalcemia: Hypercalcemia  HLD (hyperlipidemia): HLD (hyperlipidemia)  Medication management: Medication management  Need for prophylactic measure: Need for prophylactic measure  Essential hypertension: Essential hypertension  Type 2 diabetes mellitus without complication, without long-term current use of insulin: Type 2 diabetes mellitus without complication, without long-term current use of insulin          MEDICATIONS  (STANDING):  amLODIPine   Tablet 10 milliGRAM(s) Oral daily  dexamethasone  IVPB 10 milliGRAM(s) IV Intermittent once  dextrose 5%. 1000 milliLiter(s) IV Continuous <Continuous>  dextrose 50% Injectable 12.5 Gram(s) IV Push once  dextrose 50% Injectable 25 Gram(s) IV Push once  dextrose 50% Injectable 25 Gram(s) IV Push once  fenofibrate Tablet 145 milliGRAM(s) Oral daily  insulin lispro (HumaLOG) corrective regimen sliding scale   SubCutaneous three times a day before meals  insulin lispro (HumaLOG) corrective regimen sliding scale   SubCutaneous at bedtime  losartan 50 milliGRAM(s) Oral daily      Allergies    No Known Allergies    Intolerances        PAST MEDICAL & SURGICAL HISTORY:  HTN (hypertension)  Diabetes  No significant past surgical history                            13.3   7.65  )-----------( 275      ( 24 Mar 2019 04:53 )             42.1       24 Mar 2019 04:53    135    |  103    |  23     ----------------------------<  160    3.7     |  21     |  1.10     Ca    10.1       24 Mar 2019 04:53  Phos  3.5       24 Mar 2019 04:53  Mg     2.3       24 Mar 2019 04:53    TPro  7.0    /  Alb  4.2    /  TBili  0.2    /  DBili  x      /  AST  29     /  ALT  46     /  AlkPhos  59     23 Mar 2019 19:07      Urinalysis Basic - ( 23 Mar 2019 18:54 )    Color: LIGHT YELLOW / Appearance: CLEAR / S.024 / pH: 7.0  Gluc: >1000 / Ketone: NEGATIVE  / Bili: NEGATIVE / Urobili: NORMAL   Blood: NEGATIVE / Protein: NEGATIVE / Nitrite: NEGATIVE   Leuk Esterase: NEGATIVE / RBC: x / WBC x   Sq Epi: x / Non Sq Epi: x / Bacteria: x    Vital Signs Last 24 Hrs  T(C): 36.4 (19 @ 14:08), Max: 36.7 (19 @ 17:49)  T(F): 97.6 (19 @ 14:08), Max: 98 (19 @ 17:49)  HR: 96 (19 @ 14:08) (69 - 97)  BP: 147/88 (19 @ 14:08) (140/77 - 182/69)  BP(mean): --  RR: 16 (19 @ 14:08) (16 - 18)  SpO2: 99% (19 @ 14:08) (97% - 100%)    Gen: NAD    Spine PE:  Skin intact  No gross deformity  TTP over lumbar and sacral spine, No midline TTP C/T/ spine  No bony step offs  No paraspinal muscle ttp/hypertonicity   Negative clonus  Negative babinski  Negative guido  No saddle anesthesia    Motor:                   C5                C6              C7               C8           T1   R            5/5                5/5            5/5             5/5          5/5  L             5/5               5/5             5/5             5/5          5/5                L2             L3             L4               L5            S1  R         5/5           5/5          5/5             5/5           5/5  L          5/5          5/5           5/5             5/5           5/5    Sensory:            C5         C6         C7      C8       T1        (0=absent, 1=impaired, 2=normal, NT=not testable)  R         2            2           2        2         2  L          2            2           2        2         2               L2          L3         L4      L5       S1         (0=absent, 1=impaired, 2=normal, NT=not testable)  R         2            2            2        2        2  L          2            2           2        2         1    Imaging:     < from: MR Lumbar Spine No Cont (19 @ 13:45) >  Congenital central spinal canal stenosis with superimposed degenerative   changes contributing to severe central canal stenosis at L4-5. An   extruded disc fragment versus sequestered fragment resulting compression   of the thecal sac at L5-S1.    < end of copied text >    A/P: 63y Male with severe cental canal stenosis at L4-5 and possible disc extrusion at L5-S1.  Patient has chronic low back pain (at least 1 month) with symptom exacerbation after fall. No fractures but showing signs of left leg radicular symptoms.  Steroid taper: Decadron 10 q6 for 24 hours  Pain control  WBAT with assistive devices as needed/ Physical therapy  FU Labs/imaging  SCDs  No further orthopedic intervention  Folow-up with Dr. Diana 1-2 weeks after discharge [phone]

## 2019-03-24 NOTE — PROGRESS NOTE ADULT - SUBJECTIVE AND OBJECTIVE BOX
Patient is a 63y old  Male who presents with a chief complaint of Back pain (23 Mar 2019 21:21)        SUBJECTIVE / OVERNIGHT EVENTS:      MEDICATIONS  (STANDING):  amLODIPine   Tablet 10 milliGRAM(s) Oral daily  dextrose 5%. 1000 milliLiter(s) (50 mL/Hr) IV Continuous <Continuous>  dextrose 50% Injectable 12.5 Gram(s) IV Push once  dextrose 50% Injectable 25 Gram(s) IV Push once  dextrose 50% Injectable 25 Gram(s) IV Push once  fenofibrate Tablet 145 milliGRAM(s) Oral daily  insulin lispro (HumaLOG) corrective regimen sliding scale   SubCutaneous three times a day before meals  insulin lispro (HumaLOG) corrective regimen sliding scale   SubCutaneous at bedtime  losartan 50 milliGRAM(s) Oral daily    MEDICATIONS  (PRN):  dextrose 40% Gel 15 Gram(s) Oral once PRN Blood Glucose LESS THAN 70 milliGRAM(s)/deciliter  glucagon  Injectable 1 milliGRAM(s) IntraMuscular once PRN Glucose LESS THAN 70 milligrams/deciliter  morphine  - Injectable 2 milliGRAM(s) IV Push every 6 hours PRN Moderate Pain (4 - 6)  morphine  - Injectable 4 milliGRAM(s) IV Push every 6 hours PRN Severe Pain (7 - 10)        CAPILLARY BLOOD GLUCOSE      POCT Blood Glucose.: 146 mg/dL (24 Mar 2019 08:23)  POCT Blood Glucose.: 180 mg/dL (23 Mar 2019 23:33)    I&O's Summary      PHYSICAL EXAM  GENERAL: NAD, well-developed  HEAD:  Atraumatic, Normocephalic  EYES: EOMI, PERRLA, conjunctiva and sclera clear  NECK: Supple, No JVD  CHEST/LUNG: Clear to auscultation bilaterally; No wheeze  HEART: Regular rate and rhythm; No murmurs, rubs, or gallops  ABDOMEN: Soft, Nontender, Nondistended; Bowel sounds present  EXTREMITIES:  2+ Peripheral Pulses, No clubbing, cyanosis, or edema  PSYCH: AAOx3  SKIN: No rashes or lesions    LABS:                        13.3   7.65  )-----------( 275      ( 24 Mar 2019 04:53 )             42.1     03-24    135  |  103  |  23  ----------------------------<  160<H>  3.7   |  21<L>  |  1.10    Ca    10.1      24 Mar 2019 04:53  Phos  3.5     -  Mg     2.3     -24    TPro  7.0  /  Alb  4.2  /  TBili  0.2  /  DBili  x   /  AST  29  /  ALT  46<H>  /  AlkPhos  59            Urinalysis Basic - ( 23 Mar 2019 18:54 )    Color: LIGHT YELLOW / Appearance: CLEAR / S.024 / pH: 7.0  Gluc: >1000 / Ketone: NEGATIVE  / Bili: NEGATIVE / Urobili: NORMAL   Blood: NEGATIVE / Protein: NEGATIVE / Nitrite: NEGATIVE   Leuk Esterase: NEGATIVE / RBC: x / WBC x   Sq Epi: x / Non Sq Epi: x / Bacteria: x        RADIOLOGY & ADDITIONAL TESTS:    Imaging Personally Reviewed:  Consultant(s) Notes Reviewed:    Care Discussed with Consultants/Other Providers: Patient is a 63y old  Male who presents with a chief complaint of Back pain (23 Mar 2019 21:21)        SUBJECTIVE / OVERNIGHT EVENTS: Pt reports pain is not controlled because he has not recieved pain meds since 8 this AM.      MEDICATIONS  (STANDING):  amLODIPine   Tablet 10 milliGRAM(s) Oral daily  dextrose 5%. 1000 milliLiter(s) (50 mL/Hr) IV Continuous <Continuous>  dextrose 50% Injectable 12.5 Gram(s) IV Push once  dextrose 50% Injectable 25 Gram(s) IV Push once  dextrose 50% Injectable 25 Gram(s) IV Push once  fenofibrate Tablet 145 milliGRAM(s) Oral daily  insulin lispro (HumaLOG) corrective regimen sliding scale   SubCutaneous three times a day before meals  insulin lispro (HumaLOG) corrective regimen sliding scale   SubCutaneous at bedtime  losartan 50 milliGRAM(s) Oral daily    MEDICATIONS  (PRN):  dextrose 40% Gel 15 Gram(s) Oral once PRN Blood Glucose LESS THAN 70 milliGRAM(s)/deciliter  glucagon  Injectable 1 milliGRAM(s) IntraMuscular once PRN Glucose LESS THAN 70 milligrams/deciliter  morphine  - Injectable 2 milliGRAM(s) IV Push every 6 hours PRN Moderate Pain (4 - 6)  morphine  - Injectable 4 milliGRAM(s) IV Push every 6 hours PRN Severe Pain (7 - 10)        CAPILLARY BLOOD GLUCOSE      POCT Blood Glucose.: 146 mg/dL (24 Mar 2019 08:23)  POCT Blood Glucose.: 180 mg/dL (23 Mar 2019 23:33)    I&O's Summary      PHYSICAL EXAM  GENERAL: NAD, well-developed  HEAD:  Atraumatic, Normocephalic  EYES: EOMI, PERRLA, conjunctiva and sclera clear  NECK: Supple, No JVD  CHEST/LUNG: Clear to auscultation bilaterally; No wheeze  HEART: Regular rate and rhythm; No murmurs, rubs, or gallops  ABDOMEN: Soft, Nontender, Nondistended; Bowel sounds present  EXTREMITIES:  2+ Peripheral Pulses, No clubbing, cyanosis, or edema  PSYCH: AAOx3  Neuro:CNII-XII intact. 5/5 on RLE and LUE/RUE. Exam limited on LLE due to pain. Significant discomfort when attempting to move leg.   SKIN: No rashes or lesions    LABS:                        13.3   7.65  )-----------( 275      ( 24 Mar 2019 04:53 )             42.1     -    135  |  103  |  23  ----------------------------<  160<H>  3.7   |  21<L>  |  1.10    Ca    10.1      24 Mar 2019 04:53  Phos  3.5       Mg     2.3         TPro  7.0  /  Alb  4.2  /  TBili  0.2  /  DBili  x   /  AST  29  /  ALT  46<H>  /  AlkPhos  59            Urinalysis Basic - ( 23 Mar 2019 18:54 )    Color: LIGHT YELLOW / Appearance: CLEAR / S.024 / pH: 7.0  Gluc: >1000 / Ketone: NEGATIVE  / Bili: NEGATIVE / Urobili: NORMAL   Blood: NEGATIVE / Protein: NEGATIVE / Nitrite: NEGATIVE   Leuk Esterase: NEGATIVE / RBC: x / WBC x   Sq Epi: x / Non Sq Epi: x / Bacteria: x        RADIOLOGY & ADDITIONAL TESTS:    Imaging Personally Reviewed:  Consultant(s) Notes Reviewed:    Care Discussed with Consultants/Other Providers:

## 2019-03-24 NOTE — PHYSICAL THERAPY INITIAL EVALUATION ADULT - GENERAL OBSERVATIONS, REHAB EVAL
Patient was received semi-supine in bed, c/o pain but willing to participate in PT evaluation. Patient Sukumar speaking: utilized  Mendy Montes ID# [id number].

## 2019-03-24 NOTE — PROGRESS NOTE ADULT - PROBLEM SELECTOR PLAN 2
A1C 7.9 . Home regimen consists of Metformin 500mg PO BID and Janumet (unclear of dosage, clarify in AM)  - ISS  - Monitor FSGs

## 2019-03-24 NOTE — PHYSICAL THERAPY INITIAL EVALUATION ADULT - ADDITIONAL COMMENTS
Patient reports he lives with his wife in a private house, 3 steps to enter. Patient reports he was previously independent in all ADLs and did not use an assistive device for ambulation.     Patient was left semi-supine in bed as found, all lines/tubes intact and call aguero within reach, RN Sindy marsh

## 2019-03-24 NOTE — PROGRESS NOTE ADULT - PROBLEM SELECTOR PLAN 3
- Home regimen of Losartan PO QD (clarify dose in AM) and amlodipine   - Hypertensive on presentation likely due to pain  - c/w Losartan QD  - Hold for SBP <90

## 2019-03-24 NOTE — PHYSICAL THERAPY INITIAL EVALUATION ADULT - PATIENT PROFILE REVIEW, REHAB EVAL
yes/PT orders received: no formal activity order. Consult with MARICRUZ MONDRAGON, pt may participate in PT evaluation.

## 2019-03-24 NOTE — PROGRESS NOTE ADULT - PROBLEM SELECTOR PLAN 5
- Ca 10.9 on admission (albumin 4.2) of unclear etiology. Improved,  - Monitor on BMP  - No lytic lesions on CT or renal dysfunction

## 2019-03-24 NOTE — PROGRESS NOTE ADULT - PROBLEM SELECTOR PLAN 6
- Pt unclear of full med regimen-please clarify with pharmacy in AM - Pt unclear of full med regimen-please clarify with pharmacy [facility] [phone] tomorrow as pharmacy is closed on Sundays.

## 2019-03-24 NOTE — PHYSICAL THERAPY INITIAL EVALUATION ADULT - LEVEL OF INDEPENDENCE: SUPINE/SIT, REHAB EVAL
patient deferred sitting at the edge of the bed at this time secondary to pain, however able to independently adjust self in bed.

## 2019-03-24 NOTE — PHYSICAL THERAPY INITIAL EVALUATION ADULT - MANUAL MUSCLE TESTING RESULTS, REHAB EVAL
patient deferred formal strength assessment at this time secondary to pain, bilateral UEs & LEs grossly 3+/5/grossly assessed due to

## 2019-03-24 NOTE — PROGRESS NOTE ADULT - PROBLEM SELECTOR PLAN 1
severe acute on chronic lower back pain with L-sided sciatica, suspect nerve impingement. ESR neg   - CT L spine shows no evidence of acute fracture but multilevel degenerative changes. CT Pelvis w/ extensive enthesopathy and partial ankylosis of the b/l SI joints.   - + straight leg test but no bladder/bowel incontinence or evolving neuro deficits at this time.   - MRI L spine pending to r/o cord compression, low suspicion   - Frequent neuro checks. f/u MRI read- will hold NSAIDs and Lovenox if pt needs possible NSGY intervention. Restart if no intervention required.   - PT consult  - Morphine 2mg/4mg IV q6h for moderate /severe pain. severe acute on chronic lower back pain with L-sided sciatica, suspect nerve impingement. ESR neg   - CT L spine shows no evidence of acute fracture but multilevel degenerative changes. CT Pelvis w/ extensive enthesopathy and partial ankylosis of the b/l SI joints.   - + straight leg test but no bladder/bowel incontinence or evolving neuro deficits at this time.   - MRI L spin showing severe central canal stenosis with compression due to fragment.    - Frequent neuro checks. Will obtain ortho spine consult.   - will hold NSAIDs and Lovenox if pt needs possible NSGY intervention. Restart if no intervention required.   - PT consult  - Morphine 2mg/4mg IV q6h for moderate /severe pain. severe acute on chronic lower back pain with L-sided sciatica, suspect nerve impingement.  - MRI L spin showing severe central canal stenosis with compression due to fragment.    - Frequent neuro checks.   -Discussed case with ortho spine who agreed with decadron 10mg IVPB. They will see patient.    - will hold NSAIDs and Lovenox if pt needs possible NSGY intervention. Restart if no intervention required.   - PT consult  - Morphine 4mg/8mg IV q6h for moderate /severe pain.

## 2019-03-25 DIAGNOSIS — M48.061 SPINAL STENOSIS, LUMBAR REGION WITHOUT NEUROGENIC CLAUDICATION: ICD-10-CM

## 2019-03-25 LAB — GLUCOSE BLDC GLUCOMTR-MCNC: 221 MG/DL — HIGH (ref 70–99)

## 2019-03-25 PROCEDURE — 99233 SBSQ HOSP IP/OBS HIGH 50: CPT

## 2019-03-25 PROCEDURE — 99222 1ST HOSP IP/OBS MODERATE 55: CPT | Mod: GC

## 2019-03-25 RX ORDER — INSULIN GLARGINE 100 [IU]/ML
10 INJECTION, SOLUTION SUBCUTANEOUS AT BEDTIME
Qty: 0 | Refills: 0 | Status: COMPLETED | OUTPATIENT
Start: 2019-03-25 | End: 2019-03-25

## 2019-03-25 RX ORDER — DEXAMETHASONE 0.5 MG/5ML
4 ELIXIR ORAL DAILY
Qty: 0 | Refills: 0 | Status: CANCELLED | OUTPATIENT
Start: 2019-03-30 | End: 2019-03-26

## 2019-03-25 RX ORDER — GABAPENTIN 400 MG/1
600 CAPSULE ORAL THREE TIMES A DAY
Qty: 0 | Refills: 0 | Status: CANCELLED | OUTPATIENT
Start: 2019-03-29 | End: 2019-03-26

## 2019-03-25 RX ORDER — OXYCODONE HYDROCHLORIDE 5 MG/1
5 TABLET ORAL EVERY 6 HOURS
Qty: 0 | Refills: 0 | Status: DISCONTINUED | OUTPATIENT
Start: 2019-03-25 | End: 2019-03-26

## 2019-03-25 RX ORDER — GABAPENTIN 400 MG/1
300 CAPSULE ORAL THREE TIMES A DAY
Qty: 0 | Refills: 0 | Status: DISCONTINUED | OUTPATIENT
Start: 2019-03-25 | End: 2019-03-26

## 2019-03-25 RX ORDER — ENOXAPARIN SODIUM 100 MG/ML
40 INJECTION SUBCUTANEOUS AT BEDTIME
Qty: 0 | Refills: 0 | Status: DISCONTINUED | OUTPATIENT
Start: 2019-03-25 | End: 2019-03-26

## 2019-03-25 RX ORDER — GABAPENTIN 400 MG/1
300 CAPSULE ORAL AT BEDTIME
Qty: 0 | Refills: 0 | Status: DISCONTINUED | OUTPATIENT
Start: 2019-03-25 | End: 2019-03-25

## 2019-03-25 RX ORDER — LIDOCAINE 4 G/100G
1 CREAM TOPICAL DAILY
Qty: 0 | Refills: 0 | Status: DISCONTINUED | OUTPATIENT
Start: 2019-03-25 | End: 2019-03-26

## 2019-03-25 RX ORDER — OXYCODONE HYDROCHLORIDE 5 MG/1
10 TABLET ORAL EVERY 6 HOURS
Qty: 0 | Refills: 0 | Status: DISCONTINUED | OUTPATIENT
Start: 2019-03-25 | End: 2019-03-26

## 2019-03-25 RX ORDER — DEXAMETHASONE 0.5 MG/5ML
4 ELIXIR ORAL EVERY 8 HOURS
Qty: 0 | Refills: 0 | Status: DISCONTINUED | OUTPATIENT
Start: 2019-03-26 | End: 2019-03-26

## 2019-03-25 RX ORDER — DEXAMETHASONE 0.5 MG/5ML
4 ELIXIR ORAL EVERY 12 HOURS
Qty: 0 | Refills: 0 | Status: DISCONTINUED | OUTPATIENT
Start: 2019-03-27 | End: 2019-03-26

## 2019-03-25 RX ORDER — GABAPENTIN 400 MG/1
100 CAPSULE ORAL
Qty: 0 | Refills: 0 | Status: DISCONTINUED | OUTPATIENT
Start: 2019-03-25 | End: 2019-03-25

## 2019-03-25 RX ORDER — METFORMIN HYDROCHLORIDE 850 MG/1
1 TABLET ORAL
Qty: 0 | Refills: 0 | COMMUNITY

## 2019-03-25 RX ORDER — LOSARTAN POTASSIUM 100 MG/1
100 TABLET, FILM COATED ORAL DAILY
Qty: 0 | Refills: 0 | Status: DISCONTINUED | OUTPATIENT
Start: 2019-03-26 | End: 2019-03-26

## 2019-03-25 RX ADMIN — Medication 2: at 21:10

## 2019-03-25 RX ADMIN — Medication 102 MILLIGRAM(S): at 12:07

## 2019-03-25 RX ADMIN — LIDOCAINE 1 PATCH: 4 CREAM TOPICAL at 19:31

## 2019-03-25 RX ADMIN — Medication 102 MILLIGRAM(S): at 18:05

## 2019-03-25 RX ADMIN — LOSARTAN POTASSIUM 50 MILLIGRAM(S): 100 TABLET, FILM COATED ORAL at 05:42

## 2019-03-25 RX ADMIN — INSULIN GLARGINE 10 UNIT(S): 100 INJECTION, SOLUTION SUBCUTANEOUS at 21:10

## 2019-03-25 RX ADMIN — OXYCODONE HYDROCHLORIDE 10 MILLIGRAM(S): 5 TABLET ORAL at 21:44

## 2019-03-25 RX ADMIN — OXYCODONE HYDROCHLORIDE 10 MILLIGRAM(S): 5 TABLET ORAL at 20:44

## 2019-03-25 RX ADMIN — Medication 4: at 08:48

## 2019-03-25 RX ADMIN — MORPHINE SULFATE 8 MILLIGRAM(S): 50 CAPSULE, EXTENDED RELEASE ORAL at 08:49

## 2019-03-25 RX ADMIN — GABAPENTIN 300 MILLIGRAM(S): 400 CAPSULE ORAL at 21:10

## 2019-03-25 RX ADMIN — Medication 145 MILLIGRAM(S): at 12:07

## 2019-03-25 RX ADMIN — MORPHINE SULFATE 8 MILLIGRAM(S): 50 CAPSULE, EXTENDED RELEASE ORAL at 08:06

## 2019-03-25 RX ADMIN — Medication 102 MILLIGRAM(S): at 05:41

## 2019-03-25 RX ADMIN — Medication 6: at 17:33

## 2019-03-25 RX ADMIN — ENOXAPARIN SODIUM 40 MILLIGRAM(S): 100 INJECTION SUBCUTANEOUS at 21:10

## 2019-03-25 RX ADMIN — Medication 6: at 12:37

## 2019-03-25 RX ADMIN — LIDOCAINE 1 PATCH: 4 CREAM TOPICAL at 17:33

## 2019-03-25 RX ADMIN — AMLODIPINE BESYLATE 10 MILLIGRAM(S): 2.5 TABLET ORAL at 05:48

## 2019-03-25 RX ADMIN — GABAPENTIN 100 MILLIGRAM(S): 400 CAPSULE ORAL at 15:04
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
## 2019-03-25 NOTE — PROGRESS NOTE ADULT - PROBLEM SELECTOR PLAN 3
A1C 7.9 . Home regimen consists of Metformin 500mg PO BID and Janumet. Worsening hyperglycemia 2/2 decadron  - will dose lantus 10 units today  - f/u with ortho re: need for taper of steroids  - c/w monitoring BG and ALMA A1C 7.9 . Home regimen consists of two combo pills glyburide/metformin 5/500 2 tabs once a day and Janumet 50/1000 once a day (not ER metformin). Worsening hyperglycemia 2/2 decadron  - will dose lantus 10 units x1 day  - f/u with ortho re: need for taper of steroids  - c/w monitoring BG and ALMA

## 2019-03-25 NOTE — PROGRESS NOTE ADULT - PROBLEM SELECTOR PLAN 8
DVT PPx: Lovenox 40mg QD if no surgical intervention   Diet: DASH  PT consult, PMR consult, pt amenable to [facility]  dispo planning

## 2019-03-25 NOTE — PROGRESS NOTE ADULT - PROBLEM SELECTOR PLAN 1
severe acute on chronic lower back pain with L-sided sciatica, suspect nerve impingement.  MRI L spine showing severe central canal stenosis with compression of thecal sac L5-S1.   - appreciate ortho c/s   - c/w decadron 10 mg Q6H, s/p 3 doses     - resume lovenox ppx   - PT consult, f/u recs   - change IV morphine to PO oxycodone  - start gabapentin 100 mg am and afternoon and 300 mg QHS

## 2019-03-25 NOTE — CONSULT NOTE ADULT - ASSESSMENT
Lumbar radiculopathy, spinal stenosis   Continue steroids-> would dc on 10 day taper   Pain- dc IV pain meds and start on oxycodone. Increase gabapentin to 300 mh TID for 3 days, then 600 mg TID  Lidoderm patch   needs outpt PT   no need for inpt rehab

## 2019-03-25 NOTE — PROGRESS NOTE ADULT - SUBJECTIVE AND OBJECTIVE BOX
Patient is a 63y old  Male who presents with a chief complaint of Back pain    SUBJECTIVE / OVERNIGHT EVENTS:    Still with pain, mostly on L side radiating down the L leg, feels numbness on lateral aspect of L foot  No CP, SOB, f/c/n/v  Reports cannot ambulate due to pain     MEDICATIONS  (STANDING):  amLODIPine   Tablet 10 milliGRAM(s) Oral daily  dexamethasone  IVPB 10 milliGRAM(s) IV Intermittent every 6 hours  fenofibrate Tablet 145 milliGRAM(s) Oral daily  gabapentin 100 milliGRAM(s) Oral <User Schedule>  gabapentin 300 milliGRAM(s) Oral at bedtime  insulin lispro (HumaLOG) corrective regimen sliding scale   SubCutaneous three times a day before meals  insulin lispro (HumaLOG) corrective regimen sliding scale   SubCutaneous at bedtime  losartan 50 milliGRAM(s) Oral daily    MEDICATIONS  (PRN):  dextrose 40% Gel 15 Gram(s) Oral once PRN Blood Glucose LESS THAN 70 milliGRAM(s)/deciliter  glucagon  Injectable 1 milliGRAM(s) IntraMuscular once PRN Glucose LESS THAN 70 milligrams/deciliter  morphine  - Injectable 4 milliGRAM(s) IV Push every 6 hours PRN Moderate Pain (4 - 6)  morphine  - Injectable 8 milliGRAM(s) IV Push every 6 hours PRN Severe Pain (7 - 10)    T(C): 36.7 (19 @ 10:01), Max: 36.7 (19 @ 10:01)  HR: 105 (19 @ 10:02) (80 - 105)  BP: 166/82 (19 @ 10:02) (134/64 - 166/82)  RR: 16 (19 @ 10:01) (16 - 17)  SpO2: 97% (19 @ 10:01) (95% - 99%)    CAPILLARY BLOOD GLUCOSE  POCT Blood Glucose.: 281 mg/dL (25 Mar 2019 12:28)  POCT Blood Glucose.: 221 mg/dL (25 Mar 2019 08:04)  POCT Blood Glucose.: 225 mg/dL (24 Mar 2019 21:45)  POCT Blood Glucose.: 239 mg/dL (24 Mar 2019 17:24)  POCT Blood Glucose.: 163 mg/dL (24 Mar 2019 13:57)    I&O's Summary    24 Mar 2019 07:01  -  25 Mar 2019 07:00  --------------------------------------------------------  IN: 50 mL / OUT: 0 mL / NET: 50 mL    PHYSICAL EXAM:  GENERAL: NAD, well-developed  HEAD:  Atraumatic, Normocephalic  EYES: EOMI, PERRLA, conjunctiva and sclera clear  NECK: Supple, No JVD  CHEST/LUNG: Clear to auscultation bilaterally; No wheeze  HEART: Regular rate and rhythm; No murmurs, rubs, or gallops  ABDOMEN: Soft, Nontender, Nondistended; Bowel sounds present  EXTREMITIES:   warm and well perfused, No clubbing, cyanosis, or edema  PSYCH: AAOx3  NEUROLOGY: non-focal  SKIN: No rashes or lesions    LABS:                        13.3   7.65  )-----------( 275      ( 24 Mar 2019 04:53 )             42.1     -    135  |  103  |  23  ----------------------------<  160<H>  3.7   |  21<L>  |  1.10    Ca    10.1      24 Mar 2019 04:53  Phos  3.5       Mg     2.3         TPro  7.0  /  Alb  4.2  /  TBili  0.2  /  DBili  x   /  AST  29  /  ALT  46<H>  /  AlkPhos  59        Urinalysis Basic - ( 23 Mar 2019 18:54 )    Color: LIGHT YELLOW / Appearance: CLEAR / S.024 / pH: 7.0  Gluc: >1000 / Ketone: NEGATIVE  / Bili: NEGATIVE / Urobili: NORMAL   Blood: NEGATIVE / Protein: NEGATIVE / Nitrite: NEGATIVE   Leuk Esterase: NEGATIVE / RBC: x / WBC x   Sq Epi: x / Non Sq Epi: x / Bacteria: x      Consultant(s) Notes Reviewed:  ortho c/s   Care Discussed with Consultants/Other Providers: Patient is a 63y old  Male who presents with a chief complaint of Back pain    SUBJECTIVE / OVERNIGHT EVENTS:    Still with pain, mostly on L side radiating down the L leg, feels numbness on lateral aspect of L foot  No CP, SOB, f/c/n/v  Reports cannot ambulate due to pain     MEDICATIONS  (STANDING):  amLODIPine   Tablet 10 milliGRAM(s) Oral daily  dexamethasone  IVPB 10 milliGRAM(s) IV Intermittent every 6 hours  fenofibrate Tablet 145 milliGRAM(s) Oral daily  gabapentin 100 milliGRAM(s) Oral <User Schedule>  gabapentin 300 milliGRAM(s) Oral at bedtime  insulin lispro (HumaLOG) corrective regimen sliding scale   SubCutaneous three times a day before meals  insulin lispro (HumaLOG) corrective regimen sliding scale   SubCutaneous at bedtime  losartan 50 milliGRAM(s) Oral daily    MEDICATIONS  (PRN):  dextrose 40% Gel 15 Gram(s) Oral once PRN Blood Glucose LESS THAN 70 milliGRAM(s)/deciliter  glucagon  Injectable 1 milliGRAM(s) IntraMuscular once PRN Glucose LESS THAN 70 milligrams/deciliter  morphine  - Injectable 4 milliGRAM(s) IV Push every 6 hours PRN Moderate Pain (4 - 6)  morphine  - Injectable 8 milliGRAM(s) IV Push every 6 hours PRN Severe Pain (7 - 10)    T(C): 36.7 (19 @ 10:01), Max: 36.7 (19 @ 10:01)  HR: 105 (19 @ 10:02) (80 - 105)  BP: 166/82 (19 @ 10:02) (134/64 - 166/82)  RR: 16 (19 @ 10:01) (16 - 17)  SpO2: 97% (19 @ 10:01) (95% - 99%)    CAPILLARY BLOOD GLUCOSE  POCT Blood Glucose.: 281 mg/dL (25 Mar 2019 12:28)  POCT Blood Glucose.: 221 mg/dL (25 Mar 2019 08:04)  POCT Blood Glucose.: 225 mg/dL (24 Mar 2019 21:45)  POCT Blood Glucose.: 239 mg/dL (24 Mar 2019 17:24)  POCT Blood Glucose.: 163 mg/dL (24 Mar 2019 13:57)    I&O's Summary    24 Mar 2019 07:01  -  25 Mar 2019 07:00  --------------------------------------------------------  IN: 50 mL / OUT: 0 mL / NET: 50 mL    PHYSICAL EXAM:  GENERAL: NAD, well-developed  CHEST/LUNG: Clear to auscultation bilaterally; No wheeze  HEART: Regular rate and rhythm; No murmurs, rubs, or gallops  ABDOMEN: Soft, Nontender, Nondistended; Bowel sounds present  EXTREMITIES:   warm and well perfused, No clubbing, cyanosis, or edema  PSYCH: AAOx3  NEUROLOGY: full strength of LE  SKIN: No rashes or lesions    LABS:                        13.3   7.65  )-----------( 275      ( 24 Mar 2019 04:53 )             42.1     -24    135  |  103  |  23  ----------------------------<  160<H>  3.7   |  21<L>  |  1.10    Ca    10.1      24 Mar 2019 04:53  Phos  3.5       Mg     2.3         TPro  7.0  /  Alb  4.2  /  TBili  0.2  /  DBili  x   /  AST  29  /  ALT  46<H>  /  AlkPhos  59      Urinalysis Basic - ( 23 Mar 2019 18:54 )  Color: LIGHT YELLOW / Appearance: CLEAR / S.024 / pH: 7.0  Gluc: >1000 / Ketone: NEGATIVE  / Bili: NEGATIVE / Urobili: NORMAL   Blood: NEGATIVE / Protein: NEGATIVE / Nitrite: NEGATIVE   Leuk Esterase: NEGATIVE / RBC: x / WBC x   Sq Epi: x / Non Sq Epi: x / Bacteria: x    Consultant(s) Notes Reviewed:  ortho c/s   Care Discussed with Consultants/Other Providers:

## 2019-03-26 ENCOUNTER — TRANSCRIPTION ENCOUNTER (OUTPATIENT)
Age: 64
End: 2019-03-26

## 2019-03-26 VITALS
HEART RATE: 93 BPM | DIASTOLIC BLOOD PRESSURE: 87 MMHG | TEMPERATURE: 98 F | OXYGEN SATURATION: 98 % | RESPIRATION RATE: 17 BRPM | SYSTOLIC BLOOD PRESSURE: 151 MMHG

## 2019-03-26 LAB
ANION GAP SERPL CALC-SCNC: 12 MMO/L — SIGNIFICANT CHANGE UP (ref 7–14)
BUN SERPL-MCNC: 31 MG/DL — HIGH (ref 7–23)
CALCIUM SERPL-MCNC: 10.7 MG/DL — HIGH (ref 8.4–10.5)
CHLORIDE SERPL-SCNC: 100 MMOL/L — SIGNIFICANT CHANGE UP (ref 98–107)
CO2 SERPL-SCNC: 23 MMOL/L — SIGNIFICANT CHANGE UP (ref 22–31)
CREAT SERPL-MCNC: 0.91 MG/DL — SIGNIFICANT CHANGE UP (ref 0.5–1.3)
GLUCOSE SERPL-MCNC: 334 MG/DL — HIGH (ref 70–99)
HLA-B27 QL: SIGNIFICANT CHANGE UP
MAGNESIUM SERPL-MCNC: 2.5 MG/DL — SIGNIFICANT CHANGE UP (ref 1.6–2.6)
PHOSPHATE SERPL-MCNC: 2.7 MG/DL — SIGNIFICANT CHANGE UP (ref 2.5–4.5)
POTASSIUM SERPL-MCNC: 4.4 MMOL/L — SIGNIFICANT CHANGE UP (ref 3.5–5.3)
POTASSIUM SERPL-SCNC: 4.4 MMOL/L — SIGNIFICANT CHANGE UP (ref 3.5–5.3)
SODIUM SERPL-SCNC: 135 MMOL/L — SIGNIFICANT CHANGE UP (ref 135–145)

## 2019-03-26 PROCEDURE — 99239 HOSP IP/OBS DSCHRG MGMT >30: CPT

## 2019-03-26 PROCEDURE — 99232 SBSQ HOSP IP/OBS MODERATE 35: CPT | Mod: GC

## 2019-03-26 RX ORDER — CANAGLIFLOZIN 100 MG/1
1 TABLET, FILM COATED ORAL
Qty: 0 | Refills: 0 | COMMUNITY

## 2019-03-26 RX ORDER — SENNA PLUS 8.6 MG/1
2 TABLET ORAL
Qty: 60 | Refills: 0 | OUTPATIENT
Start: 2019-03-26 | End: 2019-04-24

## 2019-03-26 RX ORDER — LOSARTAN POTASSIUM 100 MG/1
1 TABLET, FILM COATED ORAL
Qty: 30 | Refills: 0
Start: 2019-03-26 | End: 2019-04-24

## 2019-03-26 RX ORDER — POLYETHYLENE GLYCOL 3350 17 G/17G
17 POWDER, FOR SOLUTION ORAL
Qty: 510 | Refills: 0
Start: 2019-03-26 | End: 2019-04-24

## 2019-03-26 RX ORDER — AMLODIPINE BESYLATE 2.5 MG/1
1 TABLET ORAL
Qty: 30 | Refills: 0
Start: 2019-03-26 | End: 2019-04-24

## 2019-03-26 RX ORDER — SITAGLIPTIN AND METFORMIN HYDROCHLORIDE 500; 50 MG/1; MG/1
1 TABLET, FILM COATED ORAL
Qty: 0 | Refills: 0 | COMMUNITY

## 2019-03-26 RX ORDER — PANTOPRAZOLE SODIUM 20 MG/1
1 TABLET, DELAYED RELEASE ORAL
Qty: 15 | Refills: 0 | OUTPATIENT
Start: 2019-03-26 | End: 2019-04-09

## 2019-03-26 RX ORDER — INSULIN LISPRO 100/ML
5 VIAL (ML) SUBCUTANEOUS
Qty: 0 | Refills: 0 | Status: DISCONTINUED | OUTPATIENT
Start: 2019-03-26 | End: 2019-03-26

## 2019-03-26 RX ORDER — DOCUSATE SODIUM 100 MG
100 CAPSULE ORAL THREE TIMES A DAY
Qty: 0 | Refills: 0 | Status: DISCONTINUED | OUTPATIENT
Start: 2019-03-26 | End: 2019-03-26

## 2019-03-26 RX ORDER — LIDOCAINE 4 G/100G
1 CREAM TOPICAL
Qty: 30 | Refills: 0 | OUTPATIENT
Start: 2019-03-26 | End: 2019-04-24

## 2019-03-26 RX ORDER — POLYETHYLENE GLYCOL 3350 17 G/17G
17 POWDER, FOR SOLUTION ORAL DAILY
Qty: 0 | Refills: 0 | Status: DISCONTINUED | OUTPATIENT
Start: 2019-03-26 | End: 2019-03-26

## 2019-03-26 RX ORDER — GABAPENTIN 400 MG/1
1 CAPSULE ORAL
Qty: 45 | Refills: 0
Start: 2019-03-26 | End: 2019-04-09

## 2019-03-26 RX ORDER — GLYBURIDE-METFORMIN HYDROCHLORIDE 1.25; 25 MG/1; MG/1
2 TABLET ORAL
Qty: 0 | Refills: 0 | COMMUNITY

## 2019-03-26 RX ORDER — SENNA PLUS 8.6 MG/1
2 TABLET ORAL AT BEDTIME
Qty: 0 | Refills: 0 | Status: DISCONTINUED | OUTPATIENT
Start: 2019-03-26 | End: 2019-03-26

## 2019-03-26 RX ORDER — AMLODIPINE BESYLATE 2.5 MG/1
1 TABLET ORAL
Qty: 0 | Refills: 0 | COMMUNITY

## 2019-03-26 RX ORDER — SITAGLIPTIN AND METFORMIN HYDROCHLORIDE 500; 50 MG/1; MG/1
1 TABLET, FILM COATED ORAL
Qty: 30 | Refills: 0
Start: 2019-03-26 | End: 2019-04-24

## 2019-03-26 RX ORDER — LOSARTAN POTASSIUM 100 MG/1
1 TABLET, FILM COATED ORAL
Qty: 0 | Refills: 0 | COMMUNITY

## 2019-03-26 RX ORDER — CANAGLIFLOZIN 100 MG/1
1 TABLET, FILM COATED ORAL
Qty: 30 | Refills: 0
Start: 2019-03-26 | End: 2019-04-24

## 2019-03-26 RX ORDER — GLYBURIDE-METFORMIN HYDROCHLORIDE 1.25; 25 MG/1; MG/1
1 TABLET ORAL
Qty: 0 | Refills: 0 | COMMUNITY

## 2019-03-26 RX ORDER — DEXAMETHASONE 0.5 MG/5ML
1 ELIXIR ORAL
Qty: 12 | Refills: 0 | OUTPATIENT
Start: 2019-03-26 | End: 2019-04-01

## 2019-03-26 RX ORDER — DOCUSATE SODIUM 100 MG
1 CAPSULE ORAL
Qty: 90 | Refills: 0 | OUTPATIENT
Start: 2019-03-26 | End: 2019-04-24

## 2019-03-26 RX ORDER — GLYBURIDE-METFORMIN HYDROCHLORIDE 1.25; 25 MG/1; MG/1
2 TABLET ORAL
Qty: 60 | Refills: 0
Start: 2019-03-26 | End: 2019-04-24

## 2019-03-26 RX ADMIN — Medication 4 MILLIGRAM(S): at 06:18

## 2019-03-26 RX ADMIN — GABAPENTIN 300 MILLIGRAM(S): 400 CAPSULE ORAL at 14:21

## 2019-03-26 RX ADMIN — OXYCODONE HYDROCHLORIDE 10 MILLIGRAM(S): 5 TABLET ORAL at 14:20

## 2019-03-26 RX ADMIN — POLYETHYLENE GLYCOL 3350 17 GRAM(S): 17 POWDER, FOR SOLUTION ORAL at 14:21

## 2019-03-26 RX ADMIN — Medication 4: at 17:25

## 2019-03-26 RX ADMIN — Medication 145 MILLIGRAM(S): at 11:49

## 2019-03-26 RX ADMIN — Medication 4 MILLIGRAM(S): at 14:21

## 2019-03-26 RX ADMIN — OXYCODONE HYDROCHLORIDE 10 MILLIGRAM(S): 5 TABLET ORAL at 06:18

## 2019-03-26 RX ADMIN — LIDOCAINE 1 PATCH: 4 CREAM TOPICAL at 11:49

## 2019-03-26 RX ADMIN — GABAPENTIN 300 MILLIGRAM(S): 400 CAPSULE ORAL at 06:20

## 2019-03-26 RX ADMIN — Medication 100 MILLIGRAM(S): at 14:21

## 2019-03-26 RX ADMIN — Medication 5 UNIT(S): at 17:25

## 2019-03-26 RX ADMIN — Medication 5 UNIT(S): at 12:35

## 2019-03-26 RX ADMIN — OXYCODONE HYDROCHLORIDE 10 MILLIGRAM(S): 5 TABLET ORAL at 07:18

## 2019-03-26 RX ADMIN — Medication 4: at 08:48

## 2019-03-26 RX ADMIN — LIDOCAINE 1 PATCH: 4 CREAM TOPICAL at 19:22

## 2019-03-26 RX ADMIN — Medication 4: at 12:36

## 2019-03-26 RX ADMIN — AMLODIPINE BESYLATE 10 MILLIGRAM(S): 2.5 TABLET ORAL at 06:18

## 2019-03-26 RX ADMIN — OXYCODONE HYDROCHLORIDE 10 MILLIGRAM(S): 5 TABLET ORAL at 15:33

## 2019-03-26 RX ADMIN — LOSARTAN POTASSIUM 100 MILLIGRAM(S): 100 TABLET, FILM COATED ORAL at 06:20

## 2019-03-26 NOTE — DISCHARGE NOTE PROVIDER - CARE PROVIDER_API CALL
Yaritza Diana)  [facility]  [address] [place] [address]  Phone: [phone]  Fax: [phone]  Follow Up Time: Yaritza Diana (MD)  [facility]  [address] [place] [address]  Phone: [phone]  Fax: [phone]  Follow Up Time:     Jasmin Nielson ()  [facility]  [address] [place] [address], [address] [place] [address]  Phone: [phone]  Fax: [phone]  Follow Up Time:

## 2019-03-26 NOTE — DISCHARGE NOTE PROVIDER - NSDCACTIVITY_GEN_ALL_CORE
Do not make important decisions/Stairs allowed/Walking - Outdoors allowed/Walking - Indoors allowed/Do not drive or operate machinery/No restrictions/Bathing allowed/Showering allowed
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
## 2019-03-26 NOTE — PROGRESS NOTE ADULT - SUBJECTIVE AND OBJECTIVE BOX
HPI:      62 y/o Male w/ a pmh significant for HTN, DM2, HLD, and sciatica presenting to the ED w/ a chief complaint of lower back pain. Pt has been experiencing severe constant, dull, aching mid lower back pain radiating down his LLE for the last 3 weeks. He had one reported mechanical fall last week while crossing the street where he fell on his back side but denies any LOC, head trauma, CP, palpitations or SOB prior to the fall. He says his back pain has been progressively worsening since this fall w/ a peak in his pain this AM. He has been unable to stand up on his own without the help of multiple family members assisting him and despite this complains of severe lower back pain. He noticed his back pain improves with walking and is alleviated with hot packs. His pain is worse while sitting upright, sharp movements, and in the PM. + numbness of his L dorsal foot intermittently over the last 3 weeks. He denies fevers, chills, n/v, CP, palpitations, SOB, abdominal pain, bladder/bowel incontinence or saddle anesthesia.     from: MR Lumbar Spine No Cont (03.24.19 @ 13:45) >  Congenital central spinal canal stenosis with superimposed degenerative   changes contributing to severe central canal stenosis at L4-5. An   extruded disc fragment versus sequestered fragment resulting compression   of the thecal sac at L5-S1.    interval: pain is better, walking around more     REVIEW OF SYSTEMS: No chest pain, shortness of breath, nausea, vomiting or diarhea.      MEDICATIONS  (STANDING):  amLODIPine   Tablet 10 milliGRAM(s) Oral daily  dexamethasone     Tablet 4 milliGRAM(s) Oral every 8 hours  dextrose 50% Injectable 12.5 Gram(s) IV Push once  dextrose 50% Injectable 25 Gram(s) IV Push once  dextrose 50% Injectable 25 Gram(s) IV Push once  docusate sodium 100 milliGRAM(s) Oral three times a day  enoxaparin Injectable 40 milliGRAM(s) SubCutaneous at bedtime  fenofibrate Tablet 145 milliGRAM(s) Oral daily  gabapentin 300 milliGRAM(s) Oral three times a day  insulin lispro (HumaLOG) corrective regimen sliding scale   SubCutaneous three times a day before meals  insulin lispro (HumaLOG) corrective regimen sliding scale   SubCutaneous at bedtime  insulin lispro Injectable (HumaLOG) 5 Unit(s) SubCutaneous three times a day before meals  lidocaine   Patch 1 Patch Transdermal daily  losartan 100 milliGRAM(s) Oral daily  polyethylene glycol 3350 17 Gram(s) Oral daily  senna 2 Tablet(s) Oral at bedtime    MEDICATIONS  (PRN):  dextrose 40% Gel 15 Gram(s) Oral once PRN Blood Glucose LESS THAN 70 milliGRAM(s)/deciliter  glucagon  Injectable 1 milliGRAM(s) IntraMuscular once PRN Glucose LESS THAN 70 milligrams/deciliter  oxyCODONE    IR 5 milliGRAM(s) Oral every 6 hours PRN Moderate Pain (4 - 6)  oxyCODONE    IR 10 milliGRAM(s) Oral every 6 hours PRN Severe Pain (7 - 10)      ----------------------------------------------------------------------------------------  PHYSICAL EXAM  Constitutional - NAD, Comfortable  Extremities - No C/C/E, No calf tenderness   MSK: + TTP LS + SLR on the left   Neurologic Exam -                      Cranial Nerves - CN 2-12 intact     Motor - No focal deficits                       Sensory - Intact to LT     Reflexes - DTR Intact, No primitive reflexive     Balance - WNL Static  Psychiatric - Mood stable, Affect WNL

## 2019-03-26 NOTE — DISCHARGE NOTE NURSING/CASE MANAGEMENT/SOCIAL WORK - NSDCDPATPORTLINK_GEN_ALL_CORE
You can access the [facility] Patient Portal, offered by [facility], by registering with the following website: [email or web address]

## 2019-03-26 NOTE — DISCHARGE NOTE PROVIDER - PROVIDER TOKENS
PROVIDER:[TOKEN:[[id number]]] PROVIDER:[TOKEN:[[id number]]],PROVIDER:[TOKEN:[[id number]]]

## 2019-03-26 NOTE — PROGRESS NOTE ADULT - PROBLEM SELECTOR PLAN 3
A1C 7.9 . Home regimen consists of two combo pills glyburide/metformin 5/500 2 tabs once a day at 10/11 am and Janumet 50/1000 once a day at night (not ER metformin), and Invokana 100 mg daily in am. Worsening hyperglycemia 2/2 decadron  - given only 6 day of steroid left will not change home meds  - on dc c/w invokana 100 mg in am and glyburide 5/metformin 500 x 2 pills in am and janumet 50/1000 once with dinner (patient starts day late so doesn't eat breakfast, am meds at 10-11am and pm with dinner)  - c/w monitoring BG and ALMA

## 2019-03-26 NOTE — PROGRESS NOTE ADULT - PROBLEM SELECTOR PLAN 7
PPX lovenox 40 mg daily   Diet: DASH  dispo home today with walker and OP PT  d/w daughter at bedside, return precautions explained, meds reviewed  dispo time 35 min
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
## 2019-03-26 NOTE — PROGRESS NOTE ADULT - PROBLEM SELECTOR PLAN 6
Ca 10.9 on admission (albumin 4.2) of unclear etiology but mild  - No lytic lesions on CT or renal dysfunction  - OP f/u with PCP

## 2019-03-26 NOTE — PROGRESS NOTE ADULT - SUBJECTIVE AND OBJECTIVE BOX
Patient is a 63y old  Male who presents with a chief complaint of Back pain    SUBJECTIVE / OVERNIGHT EVENTS:    Feels OK, still with some pain and still thinks "the swelling isn't gone"  Pain remains mostly L sided with radiation down L leg  No CP, SOB, f/c/n/v    MEDICATIONS  (STANDING):  amLODIPine   Tablet 10 milliGRAM(s) Oral daily  dexamethasone     Tablet 4 milliGRAM(s) Oral every 8 hours  docusate sodium 100 milliGRAM(s) Oral three times a day  enoxaparin Injectable 40 milliGRAM(s) SubCutaneous at bedtime  fenofibrate Tablet 145 milliGRAM(s) Oral daily  gabapentin 300 milliGRAM(s) Oral three times a day  insulin lispro (HumaLOG) corrective regimen sliding scale   SubCutaneous three times a day before meals  insulin lispro (HumaLOG) corrective regimen sliding scale   SubCutaneous at bedtime  insulin lispro Injectable (HumaLOG) 5 Unit(s) SubCutaneous three times a day before meals  lidocaine   Patch 1 Patch Transdermal daily  losartan 100 milliGRAM(s) Oral daily  polyethylene glycol 3350 17 Gram(s) Oral daily  senna 2 Tablet(s) Oral at bedtime    MEDICATIONS  (PRN):  dextrose 40% Gel 15 Gram(s) Oral once PRN Blood Glucose LESS THAN 70 milliGRAM(s)/deciliter  glucagon  Injectable 1 milliGRAM(s) IntraMuscular once PRN Glucose LESS THAN 70 milligrams/deciliter  oxyCODONE    IR 5 milliGRAM(s) Oral every 6 hours PRN Moderate Pain (4 - 6)  oxyCODONE    IR 10 milliGRAM(s) Oral every 6 hours PRN Severe Pain (7 - 10)    T(C): 36.8 (03-26-19 @ 09:32), Max: 36.8 (03-25-19 @ 14:54)  HR: 93 (03-26-19 @ 09:32) (72 - 98)  BP: 151/87 (03-26-19 @ 09:32) (115/60 - 162/84)  RR: 17 (03-26-19 @ 09:32) (16 - 18)  SpO2: 98% (03-26-19 @ 09:32) (94% - 98%)    CAPILLARY BLOOD GLUCOSE  POCT Blood Glucose.: 224 mg/dL (26 Mar 2019 12:09)  POCT Blood Glucose.: 242 mg/dL (26 Mar 2019 08:42)  POCT Blood Glucose.: 307 mg/dL (25 Mar 2019 21:01)  POCT Blood Glucose.: 274 mg/dL (25 Mar 2019 17:03)    I&O's Summary    25 Mar 2019 07:01  -  26 Mar 2019 07:00  --------------------------------------------------------  IN: 100 mL / OUT: 0 mL / NET: 100 mL    PHYSICAL EXAM:  GENERAL: NAD, well-developed  CHEST/LUNG: Clear to auscultation bilaterally; No wheeze  HEART: Regular rate and rhythm; No murmurs, rubs, or gallops  ABDOMEN: Soft, Nontender, Nondistended; Bowel sounds present  EXTREMITIES:   warm and well perfused, No clubbing, cyanosis, or edema  PSYCH: AAOx3  NEUROLOGY: full strength of LE  SKIN: No rashes or lesions    LABS:    03-26    135  |  100  |  31<H>  ----------------------------<  334<H>  4.4   |  23  |  0.91    Ca    10.7<H>      26 Mar 2019 10:40  Phos  2.7     03-26  Mg     2.5     03-26    Consultant(s) Notes Reviewed:  PMR  Care Discussed with Consultants/Other Providers:

## 2019-03-26 NOTE — DISCHARGE NOTE PROVIDER - NSDCCPTREATMENT_GEN_ALL_CORE_FT
PRINCIPAL PROCEDURE  Procedure: MRI LS spine  Findings and Treatment: FINDINGS: The normal lumbar lordosis is maintained. There is disc   desiccation throughout the spine.  L1-2: Disc spaces well-maintained.  L2-3: Anterior osteophytes and minimal disc bulging with bilateral neural   foraminal stenosis. Bilateral facet ligamentous hypertrophic changes   contribute to mild central canal stenosis.  L3-4: Minimal disc bulge and posterior osteophyte formation with moderate   bilateral neural foraminal stenosis. Bilateral facet ligamentous   hypertrophic changes due to to moderate central canal stenosis.  L4-5: Disc bulging with bilateral facet ligamentous hypertrophic   degenerative change. Severe central canal and moderate to severe   bilateral neural foraminal stenosis.  L5-S1: Disc bulging. Superimposed left parasagittal disc extrusion versus   sequestered fragment located which appears to be compressing the thecal   sac. Bilateral facet degenerative change. Underlying disc bulging narrows   the neural foramina bilaterally.  There is a congenital central spinal canal stenosis.   The marrow signal is normal. The conus is within normal limits.  There is distention of the urinary bladder.  Impression:  Motion limited study  Congenital central spinal canal stenosis with superimposed degenerative   changes contributing to severe central canal stenosis at L4-5. An   extruded disc fragment versus sequestered fragment resulting compression   of the thecal sac at L5-S1.        SECONDARY PROCEDURE  Procedure: CT scan of lumbar spine without contrast  Findings and Treatment: INTERPRETATION:  CLINICAL INFORMATION:  Back pain. Evaluate for fracture.   TECHNIQUE: A noncontrast lumbar spine CT was performed. Serial axial   images were obtained using multi slice helical technique. Sagittal and   coronal reformatted images were performed.  COMPARISON:  None. Please see separate pelvic CT report.    FINDINGS:    VERTEBRAL BODIES AND DISCS: No acute fracture. There are multilevel   degenerative changes characterized by disc bulges, ligament hypertrophy,   and facet and uncinate hypertrophy. This results in multilevel spinal   canal stenosis and multilevel neural foraminal narrowing, the overall   degree of which is not well demonstrated on this study. Anterior   osteophytes are noted.  ALIGNMENT:  No subluxations.  SPINAL CANAL:  No other intradural or extradural defects are seen.   MISCELLANEOUS:  Nonspecific distention of the urinary bladder is   partially visualized. Correlate for lack of recent micturition, bladder   outlet obstruction, or neurogenic bladder. Aortic calcifications.  IMPRESSION:   No acute fracture. Multilevel degenerative changes, as above. If the   patient has persistent back pain, consider lumbar spine MRI imaging   follow-up.  Nonspecific distention of the urinary bladder is partially visualized.   Correlate for lack of recent micturition, bladder outlet obstruction, or   neurogenic bladder

## 2019-04-01 ENCOUNTER — OUTPATIENT (OUTPATIENT)
Dept: OUTPATIENT SERVICES | Facility: HOSPITAL | Age: 64
LOS: 1 days | End: 2019-04-01
Payer: COMMERCIAL

## 2019-04-01 PROCEDURE — G9005: CPT

## 2019-04-02 PROBLEM — Z00.00 ENCOUNTER FOR PREVENTIVE HEALTH EXAMINATION: Status: ACTIVE | Noted: 2019-04-02

## 2019-04-04 ENCOUNTER — APPOINTMENT (OUTPATIENT)
Dept: ORTHOPEDIC SURGERY | Facility: CLINIC | Age: 64
End: 2019-04-04
Payer: MEDICAID

## 2019-04-04 VITALS — HEIGHT: 69 IN | WEIGHT: 235 LBS | BODY MASS INDEX: 34.8 KG/M2

## 2019-04-04 DIAGNOSIS — Z71.89 OTHER SPECIFIED COUNSELING: ICD-10-CM

## 2019-04-04 DIAGNOSIS — M16.11 UNILATERAL PRIMARY OSTEOARTHRITIS, RIGHT HIP: ICD-10-CM

## 2019-04-04 DIAGNOSIS — M16.12 UNILATERAL PRIMARY OSTEOARTHRITIS, LEFT HIP: ICD-10-CM

## 2019-04-04 PROCEDURE — 73522 X-RAY EXAM HIPS BI 3-4 VIEWS: CPT

## 2019-04-04 PROCEDURE — 99204 OFFICE O/P NEW MOD 45 MIN: CPT

## 2019-04-09 ENCOUNTER — APPOINTMENT (OUTPATIENT)
Dept: ORTHOPEDIC SURGERY | Facility: CLINIC | Age: 64
End: 2019-04-09
Payer: MEDICAID

## 2019-04-09 VITALS
BODY MASS INDEX: 34.8 KG/M2 | WEIGHT: 235 LBS | DIASTOLIC BLOOD PRESSURE: 94 MMHG | HEART RATE: 88 BPM | SYSTOLIC BLOOD PRESSURE: 159 MMHG | HEIGHT: 69 IN

## 2019-04-09 DIAGNOSIS — Z78.9 OTHER SPECIFIED HEALTH STATUS: ICD-10-CM

## 2019-04-09 DIAGNOSIS — Z86.39 PERSONAL HISTORY OF OTHER ENDOCRINE, NUTRITIONAL AND METABOLIC DISEASE: ICD-10-CM

## 2019-04-09 DIAGNOSIS — G83.4 CAUDA EQUINA SYNDROME: ICD-10-CM

## 2019-04-09 PROCEDURE — 99214 OFFICE O/P EST MOD 30 MIN: CPT

## 2019-04-09 PROCEDURE — 72100 X-RAY EXAM L-S SPINE 2/3 VWS: CPT

## 2019-04-09 RX ORDER — OXYCODONE 5 MG/1
5 TABLET ORAL EVERY 8 HOURS
Qty: 90 | Refills: 0 | Status: ACTIVE | COMMUNITY
Start: 2019-04-09 | End: 1900-01-01

## 2019-04-09 RX ORDER — GLYBURIDE 2.5 MG/1
TABLET ORAL
Refills: 0 | Status: ACTIVE | COMMUNITY

## 2019-04-09 RX ORDER — AMLODIPINE BESYLATE 5 MG/1
TABLET ORAL
Refills: 0 | Status: ACTIVE | COMMUNITY

## 2019-04-09 RX ORDER — SITAGLIPTIN AND METFORMIN HYDROCHLORIDE 50; 1000 MG/1; MG/1
TABLET, FILM COATED ORAL
Refills: 0 | Status: ACTIVE | COMMUNITY

## 2019-04-09 RX ORDER — LOSARTAN POTASSIUM 100 MG/1
TABLET, FILM COATED ORAL
Refills: 0 | Status: ACTIVE | COMMUNITY

## 2019-04-09 RX ORDER — OXYCODONE AND ACETAMINOPHEN TABLETS 10; 300 MG/1; MG/1
TABLET ORAL
Refills: 0 | Status: ACTIVE | COMMUNITY

## 2019-04-09 RX ORDER — CANAGLIFLOZIN 300 MG/1
TABLET, FILM COATED ORAL
Refills: 0 | Status: ACTIVE | COMMUNITY

## 2019-04-09 NOTE — HISTORY OF PRESENT ILLNESS
[Bending] : worsened by bending [Lifting] : worsened by lifting [Walking] : worsened by walking [Prolonged Standing] : worsened by prolonged standing [Standing] : worsened by standing [Heat] : relieved by heat [Opioid Analgesics] : relieved by opioid analgesics [Recumbency] : relieved by recumbency [Rest] : relieved by rest [7] : an average pain level of 7/10 [de-identified] : Pt with hx of right side sciatica presents with c/o left buttock pain since 02/2019, progressively worsening 03/26/19. Pt went to [facility] ER on 03/26/19 due to severe pain. Pain radiates to posterior aspect of LLE to knee associated with numbness on posterior aspect LLE from knee to ankle, tingling to left sole. Pt denies weakness. Pt participates with PT (had 1 session). Pt took dexamethasone for 7 days, gabapentin 600mg TIZD, and Percocet 5mg Q6hrs, these provides mild relief in pain. Pt denies having PATO in the past. Pt denies injury/falls. Pt denies Loss of bladder control, bladder incontinence or urinary retention.\par The patient's past medical history, past surgical history, medications, allergies, and social history were reviewed by me today with the patient and documented accordingly. In addition, the patient's family history, which is noncontributory to this visit, was also reviewed.\par  [5] : a minimum pain level of 5/10 [8] : a maximum pain level of 8/10 [Constant] : ~He/She~ states the symptoms seem to be constant [Ataxia] : no ataxia [Incontinence] : no incontinence [Loss of Dexterity] : good dexterity [Urinary Ret.] : no urinary retention

## 2019-04-09 NOTE — DISCUSSION/SUMMARY
[de-identified] : 64 yo male with L4-S1 Stenosis, HNP severe in nature , patient is at risk for cauda equina syndrome, recommend Posterior Lumbar Laminectomy L4-S1.\par \par I reviewed all major risks, benefits, and complications associated with lumbar laminectomy and/or microdiscectomy including but not limited to bleeding, infection, CSF leak, neurological injury, chronic pain, reherniation, hematoma worsening of pain, anesthetic risk, chronic pain and disability, need for further surgical intervention, medical complications (GI, , DVT, PE, cardiac, pulmonary, etc) and risk of mortality.\par \par \par \par \par \par Diagnosis, prognosis, natural history and treatment was discussed with patient. Patient was advised if the following symptoms develop: chills, fever, loss of bladder control, bladder incontinence or urinary retention, numbness/tingling or weakness is present in upper or lower extremities, to go to the nearest ER and to call the office immediately to inform us.\par \par

## 2019-04-09 NOTE — PHYSICAL EXAM
[Antalgic] : antalgic [Walker] : ambulates with walker [] : Motor: [UE Motor Strength NL] : Motor strength of the bilateral upper extremities is normal [NL] : Motor strength of the right lower extremity was normal [Motor Strength Lower Extremities] : left (weak) [UE/LE] : Sensory: Intact in bilateral upper & lower extremities [ALL] : Biceps, brachioradialis, triceps, patellar, ankle and plantar 2+ and symmetric bilaterally [Normal] : Oriented to person, place, and time, insight and judgement were intact and the affect was normal [Lakhani's Sign] : negative Lakhani's sign [Pronator Drift] : negative pronator drift [SLR] : negative straight leg raise [de-identified] : Lumbar ROM: limited, painful\par NTTP L spine and b/l paraspinals lumbar region\par Skin intact L spine. No rashes, ulcers, blisters. \par No lymphedema. \par Rapid alternating movements- intact\par Finger to nose testing- intact\par Full and non-painful ROM RUE, LUE, RLE and LLE. Skin intact RUE, LUE, RLE and LLE. No rashes, blisters, ulcers. NTTP RUE, LUE, RLE and LLE. No evidence of dislocation or subluxation B/L upper and lower extremities.\par  [de-identified] : 3 views AP and flex/ext of Lumbar Spine from [id number] 04/09/19. Read and dictated by Dr. Jim Chaves Board Certified Orthopaedic surgeon Lumbar Spondylosis\par \par \par \par EXAM: MR SPINE LUMBAR \par \par \par PROCEDURE DATE: Mar 24 2019 \par \par \par \par INTERPRETATION: MRI of the lumbar spine without contrast \par \par CLINICAL INDICATION: Back pain, rule out spinal cord compression \par \par TECHNIQUE: Multiplanar, multisequence MR images of the lumbar spine were \par obtained without the administration of intravenous contrast. \par \par COMPARISON: Lumbosacral spine CT dated 3/3/2019 \par \par FINDINGS: The normal lumbar lordosis is maintained. There is disc \par desiccation throughout the spine. \par \par L1-2: Disc spaces well-maintained. \par \par L2-3: Anterior osteophytes and minimal disc bulging with bilateral neural \par foraminal stenosis. Bilateral facet ligamentous hypertrophic changes \par contribute to mild central canal stenosis. \par \par L3-4: Minimal disc bulge and posterior osteophyte formation with moderate \par bilateral neural foraminal stenosis. Bilateral facet ligamentous \par hypertrophic changes due to to moderate central canal stenosis. \par \par L4-5: Disc bulging with bilateral facet ligamentous hypertrophic \par degenerative change. Severe central canal and moderate to severe bilateral \par neural foraminal stenosis. \par \par L5-S1: Disc bulging. Superimposed left parasagittal disc extrusion versus \par sequestered fragment located which appears to be compressing the thecal sac. \par Bilateral facet degenerative change. Underlying disc bulging narrows the \par neural foramina bilaterally. \par \par There is a congenital central spinal canal stenosis. \par \par The marrow signal is normal. The conus is within normal limits. \par \par There is distention of the urinary bladder. \par \par Impression: \par \par Motion limited study \par \par Congenital central spinal canal stenosis with superimposed degenerative \par changes contributing to severe central canal stenosis at L4-5. An extruded \par disc fragment versus sequestered fragment resulting compression of the \par thecal sac at L5-S1. \par \par \par ADALBERTO KAYE M.D., ATTENDING RADIOLOGIST \par This document has been electronically signed. Mar 24 2019 2:06PM \par \par EXAM: CT LUMBAR SPINE \par \par \par PROCEDURE DATE: Mar 23 2019 \par \par \par \par INTERPRETATION: CLINICAL INFORMATION: Back pain. Evaluate for fracture. \par \par TECHNIQUE: A noncontrast lumbar spine CT was performed. Serial axial images \par were obtained using multi slice helical technique. Sagittal and coronal \par reformatted images were performed. \par \par COMPARISON: None. Please see separate pelvic CT report. \par \par FINDINGS: \par \par VERTEBRAL BODIES AND DISCS: No acute fracture. There are multilevel \par degenerative changes characterized by disc bulges, ligament hypertrophy, and \par facet and uncinate hypertrophy. This results in multilevel spinal canal \par stenosis and multilevel neural foraminal narrowing, the overall degree of \par which is not well demonstrated on this study. Anterior osteophytes are noted. \par \par ALIGNMENT: No subluxations. \par \par SPINAL CANAL: No other intradural or extradural defects are seen. \par \par MISCELLANEOUS: Nonspecific distention of the urinary bladder is partially \par visualized. Correlate for lack of recent micturition, bladder outlet \par obstruction, or neurogenic bladder. Aortic calcifications. \par \par \par IMPRESSION: \par \par No acute fracture. Multilevel degenerative changes, as above. If the patient \par has persistent back pain, consider lumbar spine MRI imaging follow-up. \par \par Nonspecific distention of the urinary bladder is partially visualized. \par Correlate for lack of recent micturition, bladder outlet obstruction, or \par neurogenic bladder \par \par \par \par \par ISABEL MAHER M.D., RADIOLOGY RESIDENT \par This document has been electronically signed. \par SHANTA HERNANDEZ M.D., ATTENDING RADIOLOGIST \par This document has been electronically signed. Mar 23 2019 4:47PM \par

## 2019-04-12 ENCOUNTER — OUTPATIENT (OUTPATIENT)
Dept: OUTPATIENT SERVICES | Facility: HOSPITAL | Age: 64
LOS: 1 days | End: 2019-04-12
Payer: COMMERCIAL

## 2019-04-12 VITALS
HEIGHT: 69 IN | OXYGEN SATURATION: 96 % | WEIGHT: 235.01 LBS | RESPIRATION RATE: 16 BRPM | TEMPERATURE: 99 F | DIASTOLIC BLOOD PRESSURE: 78 MMHG | SYSTOLIC BLOOD PRESSURE: 142 MMHG | HEART RATE: 87 BPM

## 2019-04-12 DIAGNOSIS — M54.9 DORSALGIA, UNSPECIFIED: ICD-10-CM

## 2019-04-12 DIAGNOSIS — M48.061 SPINAL STENOSIS, LUMBAR REGION WITHOUT NEUROGENIC CLAUDICATION: ICD-10-CM

## 2019-04-12 DIAGNOSIS — E11.9 TYPE 2 DIABETES MELLITUS WITHOUT COMPLICATIONS: ICD-10-CM

## 2019-04-12 DIAGNOSIS — Z01.818 ENCOUNTER FOR OTHER PREPROCEDURAL EXAMINATION: ICD-10-CM

## 2019-04-12 DIAGNOSIS — M51.26 OTHER INTERVERTEBRAL DISC DISPLACEMENT, LUMBAR REGION: ICD-10-CM

## 2019-04-12 DIAGNOSIS — G83.4 CAUDA EQUINA SYNDROME: ICD-10-CM

## 2019-04-12 LAB
ANION GAP SERPL CALC-SCNC: 12 MMOL/L — SIGNIFICANT CHANGE UP (ref 5–17)
BUN SERPL-MCNC: 27 MG/DL — HIGH (ref 7–23)
CALCIUM SERPL-MCNC: 11 MG/DL — HIGH (ref 8.4–10.5)
CHLORIDE SERPL-SCNC: 102 MMOL/L — SIGNIFICANT CHANGE UP (ref 96–108)
CO2 SERPL-SCNC: 21 MMOL/L — LOW (ref 22–31)
CREAT SERPL-MCNC: 0.96 MG/DL — SIGNIFICANT CHANGE UP (ref 0.5–1.3)
GLUCOSE SERPL-MCNC: 164 MG/DL — HIGH (ref 70–99)
HBA1C BLD-MCNC: 8.1 % — HIGH (ref 4–5.6)
HCT VFR BLD CALC: 43.1 % — SIGNIFICANT CHANGE UP (ref 39–50)
HGB BLD-MCNC: 14 G/DL — SIGNIFICANT CHANGE UP (ref 13–17)
MCHC RBC-ENTMCNC: 30 PG — SIGNIFICANT CHANGE UP (ref 27–34)
MCHC RBC-ENTMCNC: 32.5 GM/DL — SIGNIFICANT CHANGE UP (ref 32–36)
MCV RBC AUTO: 92.5 FL — SIGNIFICANT CHANGE UP (ref 80–100)
MRSA PCR RESULT.: SIGNIFICANT CHANGE UP
PLATELET # BLD AUTO: 195 K/UL — SIGNIFICANT CHANGE UP (ref 150–400)
POTASSIUM SERPL-MCNC: 4.1 MMOL/L — SIGNIFICANT CHANGE UP (ref 3.5–5.3)
POTASSIUM SERPL-SCNC: 4.1 MMOL/L — SIGNIFICANT CHANGE UP (ref 3.5–5.3)
RBC # BLD: 4.66 M/UL — SIGNIFICANT CHANGE UP (ref 4.2–5.8)
RBC # FLD: 13.1 % — SIGNIFICANT CHANGE UP (ref 10.3–14.5)
S AUREUS DNA NOSE QL NAA+PROBE: SIGNIFICANT CHANGE UP
SODIUM SERPL-SCNC: 135 MMOL/L — SIGNIFICANT CHANGE UP (ref 135–145)
WBC # BLD: 7.35 K/UL — SIGNIFICANT CHANGE UP (ref 3.8–10.5)
WBC # FLD AUTO: 7.35 K/UL — SIGNIFICANT CHANGE UP (ref 3.8–10.5)

## 2019-04-12 PROCEDURE — 80048 BASIC METABOLIC PNL TOTAL CA: CPT

## 2019-04-12 PROCEDURE — 87640 STAPH A DNA AMP PROBE: CPT

## 2019-04-12 PROCEDURE — G0463: CPT

## 2019-04-12 PROCEDURE — 83036 HEMOGLOBIN GLYCOSYLATED A1C: CPT

## 2019-04-12 PROCEDURE — 85027 COMPLETE CBC AUTOMATED: CPT

## 2019-04-12 PROCEDURE — 87641 MR-STAPH DNA AMP PROBE: CPT

## 2019-04-12 RX ORDER — CEFAZOLIN SODIUM 1 G
2000 VIAL (EA) INJECTION ONCE
Qty: 0 | Refills: 0 | Status: DISCONTINUED | OUTPATIENT
Start: 2019-04-17 | End: 2019-04-18

## 2019-04-12 NOTE — H&P PST ADULT - NSICDXPROBLEM_GEN_ALL_CORE_FT
PROBLEM DIAGNOSES  Problem: DM (diabetes mellitus)  Assessment and Plan: Will follow up with labs/ fingerstick.   HgA1c ordered   Instructed-last dose of Invokana,  Janumet & metformin with glyburide  on 04/15/17 am  STAT FS  on the day of surgery ordered     Problem: Back pain  Assessment and Plan: L4-S1 posterior lumbar laminectomy

## 2019-04-12 NOTE — H&P PST ADULT - ACTIVITY
used to walk 3x/ week, gym 3x/week until the difficulty walking /fall on 03.13/19 used to walk 3x/ week, gym 3x/week until the difficulty walking /fall on 0313/19

## 2019-04-12 NOTE — H&P PST ADULT - NSICDXPASTMEDICALHX_GEN_ALL_CORE_FT
PAST MEDICAL HISTORY:  Diabetes 2, last finger stick for last 2 days 165-170's    HTN (hypertension) PAST MEDICAL HISTORY:  Back pain h/o right sciatica, now with left lumbar radiculopathy    Diabetes 2, last finger stick for last 2 days 165-170's    GERD (gastroesophageal reflux disease)     HTN (hypertension)     RBBB

## 2019-04-12 NOTE — H&P PST ADULT - HISTORY OF PRESENT ILLNESS
64 y/o Male w/ a pmh significant for HTN, DM2, HLD, and sciatica presenting to the ED w/ a chief complaint of lower back pain. Pt has been experiencing severe constant, dull, aching mid lower back pain radiating down his LLE for the last 3 weeks. He had one reported mechanical fall last week while crossing the street where he fell on his back side but denies any LOC, head trauma, CP, palpitations or SOB prior to the fall. He says his back pain has been progressively worsening since this fall w/ a peak in his pain this AM. He has been unable to stand up on his own without the help of multiple family members assisting him and despite this complains of severe lower back pain. He noticed his back pain improves with walking and is alleviated with hot packs. His pain is worse while sitting upright, sharp movements, and in the PM. + numbness of his L dorsal foot intermittently over the last 3 weeks. He denies fevers, chills, n/v, CP, palpitations, SOB, abdominal pain, bladder/bowel incontinence or saddle anesthesia. 63 year old Male PMH significant for HTN, DM2, HLD, and sciatica presents with left lumbar radiculopathy, scheduled for L4-S1 posterior lumbar laminectomy on 04/17/19.  Patient is presenting w/ a chief complaint of lower back pain. Pt has been experiencing constant, dull, aching mid lower back pain radiating down his LLE for the last few weeks. He had one reported mechanical fall on 03/13/19 while crossing the street where he fell on his back side but denies any LOC, head trauma, CP, palpitations or SOB prior to the fall. He says his back pain has been progressively worsening since this fall. His pain is getting worse with + numbness of his posterior part of L gluteal area down to left leg / foot .  He denies fevers, chills, n/v, CP, palpitations, SOB, abdominal pain, bladder/bowel incontinence or saddle anesthesia.

## 2019-04-12 NOTE — H&P PST ADULT - MUSCULOSKELETAL
detailed exam no joint warmth/no joint swelling/no joint erythema/left leg/no calf tenderness/decreased ROM due to pain

## 2019-04-12 NOTE — H&P PST ADULT - NSANTHOSAYNRD_GEN_A_CORE
No. CLAYTON screening performed.  STOP BANG Legend: 0-2 = LOW Risk; 3-4 = INTERMEDIATE Risk; 5-8 = HIGH Risk

## 2019-04-13 PROBLEM — M54.9 DORSALGIA, UNSPECIFIED: Chronic | Status: ACTIVE | Noted: 2019-04-12

## 2019-04-16 ENCOUNTER — TRANSCRIPTION ENCOUNTER (OUTPATIENT)
Age: 64
End: 2019-04-16

## 2019-04-16 VITALS
RESPIRATION RATE: 16 BRPM | TEMPERATURE: 99 F | DIASTOLIC BLOOD PRESSURE: 78 MMHG | HEART RATE: 87 BPM | OXYGEN SATURATION: 96 % | WEIGHT: 235.01 LBS | SYSTOLIC BLOOD PRESSURE: 142 MMHG | HEIGHT: 69 IN

## 2019-04-16 NOTE — PRE-ANESTHESIA EVALUATION ADULT - NSANTHPMHFT_GEN_ALL_CORE
63 year old Male PMH significant for HTN, DM2, HLD, and sciatica presents with left lumbar radiculopathy, scheduled for L4-S1 posterior lumbar laminectomy on 04/17/19.  Patient is presenting w/ a chief complaint of lower back pain. Pt has been experiencing constant, dull, aching mid lower back pain radiating down his LLE for the last few weeks. He had one reported mechanical fall on 03/13/19 while crossing the street where he fell on his back side but denies any LOC, head trauma, CP, palpitations or SOB prior to the fall. He says his back pain has been progressively worsening since this fall. His pain is getting worse with + numbness of his posterior part of L gluteal area down to left leg / foot .  He denies fevers, chills, n/v, CP, palpitations, SOB, abdominal pain, bladder/bowel incontinence or saddle anesthesia.

## 2019-04-17 ENCOUNTER — APPOINTMENT (OUTPATIENT)
Dept: ORTHOPEDIC SURGERY | Facility: HOSPITAL | Age: 64
End: 2019-04-17

## 2019-04-17 ENCOUNTER — RESULT REVIEW (OUTPATIENT)
Age: 64
End: 2019-04-17

## 2019-04-17 ENCOUNTER — INPATIENT (INPATIENT)
Facility: HOSPITAL | Age: 64
LOS: 0 days | Discharge: HOME | DRG: 517 | End: 2019-04-18
Attending: ORTHOPAEDIC SURGERY | Admitting: ORTHOPAEDIC SURGERY
Payer: COMMERCIAL

## 2019-04-17 DIAGNOSIS — G83.4 CAUDA EQUINA SYNDROME: ICD-10-CM

## 2019-04-17 DIAGNOSIS — M48.061 SPINAL STENOSIS, LUMBAR REGION WITHOUT NEUROGENIC CLAUDICATION: ICD-10-CM

## 2019-04-17 DIAGNOSIS — M51.26 OTHER INTERVERTEBRAL DISC DISPLACEMENT, LUMBAR REGION: ICD-10-CM

## 2019-04-17 LAB
ANION GAP SERPL CALC-SCNC: 12 MMOL/L — SIGNIFICANT CHANGE UP (ref 5–17)
BASOPHILS # BLD AUTO: 0 K/UL — SIGNIFICANT CHANGE UP (ref 0–0.2)
BASOPHILS NFR BLD AUTO: 0.1 % — SIGNIFICANT CHANGE UP (ref 0–2)
BUN SERPL-MCNC: 24 MG/DL — HIGH (ref 7–23)
CALCIUM SERPL-MCNC: 9.8 MG/DL — SIGNIFICANT CHANGE UP (ref 8.4–10.5)
CHLORIDE SERPL-SCNC: 100 MMOL/L — SIGNIFICANT CHANGE UP (ref 96–108)
CO2 SERPL-SCNC: 19 MMOL/L — LOW (ref 22–31)
CREAT SERPL-MCNC: 0.98 MG/DL — SIGNIFICANT CHANGE UP (ref 0.5–1.3)
EOSINOPHIL # BLD AUTO: 0.1 K/UL — SIGNIFICANT CHANGE UP (ref 0–0.5)
EOSINOPHIL NFR BLD AUTO: 0.7 % — SIGNIFICANT CHANGE UP (ref 0–6)
GLUCOSE BLDC GLUCOMTR-MCNC: 161 MG/DL — HIGH (ref 70–99)
GLUCOSE BLDC GLUCOMTR-MCNC: 270 MG/DL — HIGH (ref 70–99)
GLUCOSE SERPL-MCNC: 264 MG/DL — HIGH (ref 70–99)
HCT VFR BLD CALC: 44.1 % — SIGNIFICANT CHANGE UP (ref 39–50)
HGB BLD-MCNC: 14.8 G/DL — SIGNIFICANT CHANGE UP (ref 13–17)
LYMPHOCYTES # BLD AUTO: 0.7 K/UL — LOW (ref 1–3.3)
LYMPHOCYTES # BLD AUTO: 9 % — LOW (ref 13–44)
MCHC RBC-ENTMCNC: 30.8 PG — SIGNIFICANT CHANGE UP (ref 27–34)
MCHC RBC-ENTMCNC: 33.5 GM/DL — SIGNIFICANT CHANGE UP (ref 32–36)
MCV RBC AUTO: 92.1 FL — SIGNIFICANT CHANGE UP (ref 80–100)
MONOCYTES # BLD AUTO: 0.1 K/UL — SIGNIFICANT CHANGE UP (ref 0–0.9)
MONOCYTES NFR BLD AUTO: 1.6 % — LOW (ref 2–14)
NEUTROPHILS # BLD AUTO: 6.9 K/UL — SIGNIFICANT CHANGE UP (ref 1.8–7.4)
NEUTROPHILS NFR BLD AUTO: 88.8 % — HIGH (ref 43–77)
PLATELET # BLD AUTO: 214 K/UL — SIGNIFICANT CHANGE UP (ref 150–400)
POTASSIUM SERPL-MCNC: 4.7 MMOL/L — SIGNIFICANT CHANGE UP (ref 3.5–5.3)
POTASSIUM SERPL-SCNC: 4.7 MMOL/L — SIGNIFICANT CHANGE UP (ref 3.5–5.3)
RBC # BLD: 4.79 M/UL — SIGNIFICANT CHANGE UP (ref 4.2–5.8)
RBC # FLD: 12.6 % — SIGNIFICANT CHANGE UP (ref 10.3–14.5)
SODIUM SERPL-SCNC: 131 MMOL/L — LOW (ref 135–145)
WBC # BLD: 7.8 K/UL — SIGNIFICANT CHANGE UP (ref 3.8–10.5)
WBC # FLD AUTO: 7.8 K/UL — SIGNIFICANT CHANGE UP (ref 3.8–10.5)

## 2019-04-17 PROCEDURE — 88304 TISSUE EXAM BY PATHOLOGIST: CPT | Mod: 26

## 2019-04-17 PROCEDURE — 63048 LAM FACETEC &FORAMOT EA ADDL: CPT

## 2019-04-17 PROCEDURE — 63047 LAM FACETEC & FORAMOT LUMBAR: CPT

## 2019-04-17 PROCEDURE — 88311 DECALCIFY TISSUE: CPT | Mod: 26

## 2019-04-17 RX ORDER — SENNA PLUS 8.6 MG/1
2 TABLET ORAL AT BEDTIME
Qty: 0 | Refills: 0 | Status: DISCONTINUED | OUTPATIENT
Start: 2019-04-17 | End: 2019-04-18

## 2019-04-17 RX ORDER — FENOFIBRATE,MICRONIZED 130 MG
145 CAPSULE ORAL DAILY
Qty: 0 | Refills: 0 | Status: DISCONTINUED | OUTPATIENT
Start: 2019-04-17 | End: 2019-04-18

## 2019-04-17 RX ORDER — ASCORBIC ACID 60 MG
500 TABLET,CHEWABLE ORAL
Qty: 0 | Refills: 0 | Status: DISCONTINUED | OUTPATIENT
Start: 2019-04-17 | End: 2019-04-18

## 2019-04-17 RX ORDER — NALOXONE HYDROCHLORIDE 4 MG/.1ML
0.1 SPRAY NASAL
Qty: 0 | Refills: 0 | Status: DISCONTINUED | OUTPATIENT
Start: 2019-04-17 | End: 2019-04-17

## 2019-04-17 RX ORDER — ONDANSETRON 8 MG/1
4 TABLET, FILM COATED ORAL EVERY 6 HOURS
Qty: 0 | Refills: 0 | Status: DISCONTINUED | OUTPATIENT
Start: 2019-04-17 | End: 2019-04-18

## 2019-04-17 RX ORDER — DEXAMETHASONE 0.5 MG/5ML
ELIXIR ORAL
Qty: 0 | Refills: 0 | Status: DISCONTINUED | OUTPATIENT
Start: 2019-04-17 | End: 2019-04-18

## 2019-04-17 RX ORDER — GABAPENTIN 400 MG/1
600 CAPSULE ORAL THREE TIMES A DAY
Qty: 0 | Refills: 0 | Status: DISCONTINUED | OUTPATIENT
Start: 2019-04-17 | End: 2019-04-18

## 2019-04-17 RX ORDER — SODIUM CHLORIDE 9 MG/ML
1000 INJECTION, SOLUTION INTRAVENOUS
Qty: 0 | Refills: 0 | Status: DISCONTINUED | OUTPATIENT
Start: 2019-04-17 | End: 2019-04-18

## 2019-04-17 RX ORDER — AMLODIPINE BESYLATE 2.5 MG/1
10 TABLET ORAL DAILY
Qty: 0 | Refills: 0 | Status: DISCONTINUED | OUTPATIENT
Start: 2019-04-17 | End: 2019-04-18

## 2019-04-17 RX ORDER — HYDROMORPHONE HYDROCHLORIDE 2 MG/ML
30 INJECTION INTRAMUSCULAR; INTRAVENOUS; SUBCUTANEOUS
Qty: 0 | Refills: 0 | Status: DISCONTINUED | OUTPATIENT
Start: 2019-04-17 | End: 2019-04-17

## 2019-04-17 RX ORDER — MAGNESIUM HYDROXIDE 400 MG/1
30 TABLET, CHEWABLE ORAL EVERY 12 HOURS
Qty: 0 | Refills: 0 | Status: DISCONTINUED | OUTPATIENT
Start: 2019-04-17 | End: 2019-04-18

## 2019-04-17 RX ORDER — LIDOCAINE HCL 20 MG/ML
0.2 VIAL (ML) INJECTION ONCE
Qty: 0 | Refills: 0 | Status: DISCONTINUED | OUTPATIENT
Start: 2019-04-17 | End: 2019-04-17

## 2019-04-17 RX ORDER — DOCUSATE SODIUM 100 MG
100 CAPSULE ORAL THREE TIMES A DAY
Qty: 0 | Refills: 0 | Status: DISCONTINUED | OUTPATIENT
Start: 2019-04-17 | End: 2019-04-18

## 2019-04-17 RX ORDER — FOLIC ACID 0.8 MG
1 TABLET ORAL DAILY
Qty: 0 | Refills: 0 | Status: DISCONTINUED | OUTPATIENT
Start: 2019-04-17 | End: 2019-04-18

## 2019-04-17 RX ORDER — HYDROMORPHONE HYDROCHLORIDE 2 MG/ML
0.5 INJECTION INTRAMUSCULAR; INTRAVENOUS; SUBCUTANEOUS
Qty: 0 | Refills: 0 | Status: DISCONTINUED | OUTPATIENT
Start: 2019-04-17 | End: 2019-04-18

## 2019-04-17 RX ORDER — SODIUM CHLORIDE 9 MG/ML
3 INJECTION INTRAMUSCULAR; INTRAVENOUS; SUBCUTANEOUS EVERY 8 HOURS
Qty: 0 | Refills: 0 | Status: DISCONTINUED | OUTPATIENT
Start: 2019-04-17 | End: 2019-04-17

## 2019-04-17 RX ORDER — DEXTROSE 50 % IN WATER 50 %
15 SYRINGE (ML) INTRAVENOUS ONCE
Qty: 0 | Refills: 0 | Status: DISCONTINUED | OUTPATIENT
Start: 2019-04-17 | End: 2019-04-18

## 2019-04-17 RX ORDER — ACETAMINOPHEN 500 MG
650 TABLET ORAL EVERY 6 HOURS
Qty: 0 | Refills: 0 | Status: DISCONTINUED | OUTPATIENT
Start: 2019-04-17 | End: 2019-04-18

## 2019-04-17 RX ORDER — INSULIN LISPRO 100/ML
VIAL (ML) SUBCUTANEOUS
Qty: 0 | Refills: 0 | Status: DISCONTINUED | OUTPATIENT
Start: 2019-04-17 | End: 2019-04-18

## 2019-04-17 RX ORDER — DEXAMETHASONE 0.5 MG/5ML
5 ELIXIR ORAL EVERY 6 HOURS
Qty: 0 | Refills: 0 | Status: DISCONTINUED | OUTPATIENT
Start: 2019-04-17 | End: 2019-04-18

## 2019-04-17 RX ORDER — OXYCODONE HYDROCHLORIDE 5 MG/1
10 TABLET ORAL EVERY 4 HOURS
Qty: 0 | Refills: 0 | Status: DISCONTINUED | OUTPATIENT
Start: 2019-04-17 | End: 2019-04-18

## 2019-04-17 RX ORDER — ONDANSETRON 8 MG/1
4 TABLET, FILM COATED ORAL ONCE
Qty: 0 | Refills: 0 | Status: DISCONTINUED | OUTPATIENT
Start: 2019-04-17 | End: 2019-04-18

## 2019-04-17 RX ORDER — DEXTROSE 50 % IN WATER 50 %
12.5 SYRINGE (ML) INTRAVENOUS ONCE
Qty: 0 | Refills: 0 | Status: DISCONTINUED | OUTPATIENT
Start: 2019-04-17 | End: 2019-04-18

## 2019-04-17 RX ORDER — DEXTROSE 50 % IN WATER 50 %
25 SYRINGE (ML) INTRAVENOUS ONCE
Qty: 0 | Refills: 0 | Status: DISCONTINUED | OUTPATIENT
Start: 2019-04-17 | End: 2019-04-18

## 2019-04-17 RX ORDER — OXYCODONE HYDROCHLORIDE 5 MG/1
5 TABLET ORAL EVERY 4 HOURS
Qty: 0 | Refills: 0 | Status: DISCONTINUED | OUTPATIENT
Start: 2019-04-17 | End: 2019-04-18

## 2019-04-17 RX ORDER — CEFAZOLIN SODIUM 1 G
2000 VIAL (EA) INJECTION EVERY 8 HOURS
Qty: 0 | Refills: 0 | Status: COMPLETED | OUTPATIENT
Start: 2019-04-17 | End: 2019-04-18

## 2019-04-17 RX ORDER — LOSARTAN POTASSIUM 100 MG/1
100 TABLET, FILM COATED ORAL DAILY
Qty: 0 | Refills: 0 | Status: DISCONTINUED | OUTPATIENT
Start: 2019-04-17 | End: 2019-04-18

## 2019-04-17 RX ORDER — GLUCAGON INJECTION, SOLUTION 0.5 MG/.1ML
1 INJECTION, SOLUTION SUBCUTANEOUS ONCE
Qty: 0 | Refills: 0 | Status: DISCONTINUED | OUTPATIENT
Start: 2019-04-17 | End: 2019-04-18

## 2019-04-17 RX ORDER — CHLORHEXIDINE GLUCONATE 213 G/1000ML
1 SOLUTION TOPICAL ONCE
Qty: 0 | Refills: 0 | Status: DISCONTINUED | OUTPATIENT
Start: 2019-04-17 | End: 2019-04-17

## 2019-04-17 RX ADMIN — SODIUM CHLORIDE 3 MILLILITER(S): 9 INJECTION INTRAMUSCULAR; INTRAVENOUS; SUBCUTANEOUS at 16:57

## 2019-04-17 RX ADMIN — Medication 3: at 21:48

## 2019-04-17 NOTE — BRIEF OPERATIVE NOTE - NSICDXBRIEFPROCEDURE_GEN_ALL_CORE_FT
PROCEDURES:  Lumbar discectomy 17-Apr-2019 20:11:53  Min, Carol  Laminectomy, lumbar, one or two levels 17-Apr-2019 20:11:39  Min, Carol

## 2019-04-17 NOTE — CHART NOTE - NSCHARTNOTEFT_GEN_A_CORE
Patient seen in RR with family at bedside. Patient Resting with complaints of mild low back pain and numbness to lateral side of left foot.  Denies Chest Pain, SOB, N/V.  Pre op s/sx: low back pain with radiculopathy to LLE, decreased sensation to LLE; Post op, patient reports: mild low back pain, numbness to lateral side of left foot.     T(C): 36.6 (04-17-19 @ 20:15), Max: 37 (04-16-19 @ 22:28)  HR: 88 (04-17-19 @ 21:30) (84 - 89)  BP: 136/82 (04-17-19 @ 21:30) (121/74 - 142/78)  RR: 16 (04-17-19 @ 21:30) (14 - 20)  SpO2: 99% (04-17-19 @ 21:30) (96% - 100%)  Wt(kg): --    Exam:   Alert/Oriented, No Acute Distress  Cards: +S1/S2, RRR  Pulm: CTAB           Dressing: [x] clean/dry/intact          Sensation:   [x] decreased: S1 distribution left foot         Motor exam: [  ]          [ ] Upper extremity                Bi          Tri        Delt                                                    R         5/5        5/5        5/5                                               L          5/5        5/5        5/5                  [ ] Lower extremity           PF          DF         EHL       FHL                                                                                            R        5/5        5/5        5/5       5/5                                                        L         5/5        5/5        5/5       5/5                                                                  Calves Soft/Non-tender bilaterally         Toes warm well perfused; capillary refill <3 seconds              LABS:                        14.8   7.8   )-----------( 214      ( 17 Apr 2019 21:07 )             44.1     04-17    131<L>  |  100  |  24<H>  ----------------------------<  264<H>  4.7   |  19<L>  |  0.98    Ca    9.8      17 Apr 2019 20:55          A/P : Patient is a 63y Male s/p L4-S1 laminectomy   -    Pain control  -    Taper  -    DVT ppx: SCDs       -    Physical Therapy  -    Weight bearing status: WBAT [x]  -    OOB to chair  -    FU AM Labs  -    Resume home meds      CHINO Hernandez-C  Team Pager #[phone]

## 2019-04-18 ENCOUNTER — TRANSCRIPTION ENCOUNTER (OUTPATIENT)
Age: 64
End: 2019-04-18

## 2019-04-18 ENCOUNTER — INBOUND DOCUMENT (OUTPATIENT)
Age: 64
End: 2019-04-18

## 2019-04-18 VITALS
RESPIRATION RATE: 18 BRPM | DIASTOLIC BLOOD PRESSURE: 89 MMHG | TEMPERATURE: 98 F | HEART RATE: 92 BPM | OXYGEN SATURATION: 95 % | SYSTOLIC BLOOD PRESSURE: 148 MMHG

## 2019-04-18 PROBLEM — K21.9 GASTRO-ESOPHAGEAL REFLUX DISEASE WITHOUT ESOPHAGITIS: Chronic | Status: ACTIVE | Noted: 2019-04-12

## 2019-04-18 PROBLEM — I45.10 UNSPECIFIED RIGHT BUNDLE-BRANCH BLOCK: Chronic | Status: ACTIVE | Noted: 2019-04-12

## 2019-04-18 LAB
ANION GAP SERPL CALC-SCNC: 12 MMOL/L — SIGNIFICANT CHANGE UP (ref 5–17)
BASOPHILS # BLD AUTO: 0.01 K/UL — SIGNIFICANT CHANGE UP (ref 0–0.2)
BASOPHILS NFR BLD AUTO: 0.1 % — SIGNIFICANT CHANGE UP (ref 0–2)
BUN SERPL-MCNC: 23 MG/DL — SIGNIFICANT CHANGE UP (ref 7–23)
CALCIUM SERPL-MCNC: 10.2 MG/DL — SIGNIFICANT CHANGE UP (ref 8.4–10.5)
CHLORIDE SERPL-SCNC: 100 MMOL/L — SIGNIFICANT CHANGE UP (ref 96–108)
CO2 SERPL-SCNC: 20 MMOL/L — LOW (ref 22–31)
CREAT SERPL-MCNC: 0.86 MG/DL — SIGNIFICANT CHANGE UP (ref 0.5–1.3)
EOSINOPHIL # BLD AUTO: 0 K/UL — SIGNIFICANT CHANGE UP (ref 0–0.5)
EOSINOPHIL NFR BLD AUTO: 0 % — SIGNIFICANT CHANGE UP (ref 0–6)
GLUCOSE BLDC GLUCOMTR-MCNC: 290 MG/DL — HIGH (ref 70–99)
GLUCOSE BLDC GLUCOMTR-MCNC: 331 MG/DL — HIGH (ref 70–99)
GLUCOSE SERPL-MCNC: 274 MG/DL — HIGH (ref 70–99)
HCT VFR BLD CALC: 42.1 % — SIGNIFICANT CHANGE UP (ref 39–50)
HGB BLD-MCNC: 13.8 G/DL — SIGNIFICANT CHANGE UP (ref 13–17)
IMM GRANULOCYTES NFR BLD AUTO: 0.3 % — SIGNIFICANT CHANGE UP (ref 0–1.5)
LYMPHOCYTES # BLD AUTO: 0.52 K/UL — LOW (ref 1–3.3)
LYMPHOCYTES # BLD AUTO: 7.6 % — LOW (ref 13–44)
MCHC RBC-ENTMCNC: 29.8 PG — SIGNIFICANT CHANGE UP (ref 27–34)
MCHC RBC-ENTMCNC: 32.8 GM/DL — SIGNIFICANT CHANGE UP (ref 32–36)
MCV RBC AUTO: 90.9 FL — SIGNIFICANT CHANGE UP (ref 80–100)
MONOCYTES # BLD AUTO: 0.2 K/UL — SIGNIFICANT CHANGE UP (ref 0–0.9)
MONOCYTES NFR BLD AUTO: 2.9 % — SIGNIFICANT CHANGE UP (ref 2–14)
NEUTROPHILS # BLD AUTO: 6.11 K/UL — SIGNIFICANT CHANGE UP (ref 1.8–7.4)
NEUTROPHILS NFR BLD AUTO: 89.1 % — HIGH (ref 43–77)
PLATELET # BLD AUTO: 236 K/UL — SIGNIFICANT CHANGE UP (ref 150–400)
POTASSIUM SERPL-MCNC: 4.2 MMOL/L — SIGNIFICANT CHANGE UP (ref 3.5–5.3)
POTASSIUM SERPL-SCNC: 4.2 MMOL/L — SIGNIFICANT CHANGE UP (ref 3.5–5.3)
RBC # BLD: 4.63 M/UL — SIGNIFICANT CHANGE UP (ref 4.2–5.8)
RBC # FLD: 12.7 % — SIGNIFICANT CHANGE UP (ref 10.3–14.5)
SODIUM SERPL-SCNC: 132 MMOL/L — LOW (ref 135–145)
WBC # BLD: 6.86 K/UL — SIGNIFICANT CHANGE UP (ref 3.8–10.5)
WBC # FLD AUTO: 6.86 K/UL — SIGNIFICANT CHANGE UP (ref 3.8–10.5)

## 2019-04-18 RX ORDER — OXYCODONE HYDROCHLORIDE 5 MG/1
1 TABLET ORAL
Qty: 50 | Refills: 0
Start: 2019-04-18

## 2019-04-18 RX ORDER — DEXAMETHASONE 0.5 MG/5ML
3 ELIXIR ORAL EVERY 6 HOURS
Qty: 0 | Refills: 0 | Status: DISCONTINUED | OUTPATIENT
Start: 2019-04-19 | End: 2019-04-18

## 2019-04-18 RX ORDER — OXYCODONE HYDROCHLORIDE 5 MG/1
1 TABLET ORAL
Qty: 0 | Refills: 0 | COMMUNITY
Start: 2019-04-18

## 2019-04-18 RX ORDER — DEXAMETHASONE 0.5 MG/5ML
1 ELIXIR ORAL EVERY 6 HOURS
Qty: 0 | Refills: 0 | Status: CANCELLED | OUTPATIENT
Start: 2019-04-20 | End: 2019-04-18

## 2019-04-18 RX ORDER — DEXAMETHASONE 0.5 MG/5ML
1 ELIXIR ORAL
Qty: 21 | Refills: 0
Start: 2019-04-18

## 2019-04-18 RX ORDER — INFLUENZA VIRUS VACCINE 15; 15; 15; 15 UG/.5ML; UG/.5ML; UG/.5ML; UG/.5ML
0.5 SUSPENSION INTRAMUSCULAR ONCE
Qty: 0 | Refills: 0 | Status: COMPLETED | OUTPATIENT
Start: 2019-04-18 | End: 2019-04-18

## 2019-04-18 RX ORDER — ASPIRIN/CALCIUM CARB/MAGNESIUM 324 MG
1 TABLET ORAL
Qty: 0 | Refills: 0 | COMMUNITY

## 2019-04-18 RX ORDER — DOCUSATE SODIUM 100 MG
1 CAPSULE ORAL
Qty: 0 | Refills: 0 | DISCHARGE
Start: 2019-04-18

## 2019-04-18 RX ORDER — ACETAMINOPHEN 500 MG
2 TABLET ORAL
Qty: 0 | Refills: 0 | DISCHARGE
Start: 2019-04-18

## 2019-04-18 RX ADMIN — Medication 1 TABLET(S): at 05:22

## 2019-04-18 RX ADMIN — Medication 500 MILLIGRAM(S): at 05:23

## 2019-04-18 RX ADMIN — GABAPENTIN 600 MILLIGRAM(S): 400 CAPSULE ORAL at 08:45

## 2019-04-18 RX ADMIN — OXYCODONE HYDROCHLORIDE 10 MILLIGRAM(S): 5 TABLET ORAL at 05:23

## 2019-04-18 RX ADMIN — Medication 1 TABLET(S): at 13:36

## 2019-04-18 RX ADMIN — INFLUENZA VIRUS VACCINE 0.5 MILLILITER(S): 15; 15; 15; 15 SUSPENSION INTRAMUSCULAR at 13:56

## 2019-04-18 RX ADMIN — OXYCODONE HYDROCHLORIDE 10 MILLIGRAM(S): 5 TABLET ORAL at 05:53

## 2019-04-18 RX ADMIN — Medication 145 MILLIGRAM(S): at 11:51

## 2019-04-18 RX ADMIN — OXYCODONE HYDROCHLORIDE 10 MILLIGRAM(S): 5 TABLET ORAL at 13:36

## 2019-04-18 RX ADMIN — LOSARTAN POTASSIUM 100 MILLIGRAM(S): 100 TABLET, FILM COATED ORAL at 05:22

## 2019-04-18 RX ADMIN — Medication 100 MILLIGRAM(S): at 02:00

## 2019-04-18 RX ADMIN — AMLODIPINE BESYLATE 10 MILLIGRAM(S): 2.5 TABLET ORAL at 05:22

## 2019-04-18 RX ADMIN — Medication 1 TABLET(S): at 11:51

## 2019-04-18 RX ADMIN — OXYCODONE HYDROCHLORIDE 10 MILLIGRAM(S): 5 TABLET ORAL at 09:56

## 2019-04-18 RX ADMIN — Medication 4: at 12:02

## 2019-04-18 RX ADMIN — Medication 100 MILLIGRAM(S): at 13:36

## 2019-04-18 RX ADMIN — OXYCODONE HYDROCHLORIDE 10 MILLIGRAM(S): 5 TABLET ORAL at 00:08

## 2019-04-18 RX ADMIN — OXYCODONE HYDROCHLORIDE 10 MILLIGRAM(S): 5 TABLET ORAL at 00:40

## 2019-04-18 RX ADMIN — Medication 5 MILLIGRAM(S): at 02:05

## 2019-04-18 RX ADMIN — OXYCODONE HYDROCHLORIDE 10 MILLIGRAM(S): 5 TABLET ORAL at 09:26

## 2019-04-18 RX ADMIN — OXYCODONE HYDROCHLORIDE 10 MILLIGRAM(S): 5 TABLET ORAL at 14:06

## 2019-04-18 RX ADMIN — Medication 5 MILLIGRAM(S): at 11:51

## 2019-04-18 RX ADMIN — Medication 5 MILLIGRAM(S): at 05:22

## 2019-04-18 RX ADMIN — Medication 1 MILLIGRAM(S): at 11:51

## 2019-04-18 RX ADMIN — Medication 3: at 07:46

## 2019-04-18 RX ADMIN — Medication 100 MILLIGRAM(S): at 10:00

## 2019-04-18 NOTE — DISCHARGE NOTE PROVIDER - NSDCFUADDINST_GEN_ALL_CORE_FT
Keep surgical incision/dressing clean and dry. Continue weight bearing as tolerated ambulation. Follow up with Dr. Chaves

## 2019-04-18 NOTE — DISCHARGE NOTE PROVIDER - HOSPITAL COURSE
Reason for Admission:    Reason for Admission    left lumbar radiculopathy         History of Present Illness:    History of Present Illness        63 year old Male PMH significant for HTN, DM2, HLD, and sciatica presents with left lumbar radiculopathy, scheduled for L4-S1 posterior lumbar laminectomy on 04/17/19.    Patient is presenting w/ a chief complaint of lower back pain. Pt has been experiencing constant, dull, aching mid lower back pain radiating down his LLE for the last few weeks. He had one reported mechanical fall on 03/13/19 while crossing the street where he fell on his back side but denies any LOC, head trauma, CP, palpitations or SOB prior to the fall. He says his back pain has been progressively worsening since this fall. His pain is getting worse with + numbness of his posterior part of L gluteal area down to left leg / foot .    He denies fevers, chills, n/v, CP, palpitations, SOB, abdominal pain, bladder/bowel incontinence or saddle anesthesia.         Past Medical, Past Surgical History:    PAST MEDICAL HISTORY:    Back pain h/o right sciatica, now with left lumbar radiculopathy    Diabetes 2, last finger stick for last 2 days 165-170's    GERD (gastroesophageal reflux disease)     HTN (hypertension)     RBBB.         PAST SURGICAL HISTORY:    No significant past surgical history.        HOSPITAL COURSE;    62 y/o M underwent L4-S1 posterior laminectomy on 4/17/19 with Dr.R. Chaves.  Patient tolerated procedure well.  Patient was evaluated postoperatively by physical and occupational therapists  and _.  Patient advised to keep surgical incision/dressing clean and dry, and have dressing and surgical staples removed and steri strips applied post op day #14.  Patient further advised to follow up with  _ in _.

## 2019-04-18 NOTE — PROGRESS NOTE ADULT - ASSESSMENT
Impression: Stable       Plan:   Continue present treatment                 Out of bed, ambulate                  Physical therapy evaluation                  Continue to monitor    Dariel Dunaway PA-C  Orthopaedic Surgery  Team pager [phone]  [phone]

## 2019-04-18 NOTE — PHYSICAL THERAPY INITIAL EVALUATION ADULT - PERTINENT HX OF CURRENT PROBLEM, REHAB EVAL
Pt is a 64 y/o Male w/ a pmh significant for HTN, DM2, HLD, and sciatica presenting to the ED w/ a chief complaint of lower back pain. Pt has been experiencing severe constant, dull, aching mid LBP radiating down his LLE for the last 3 weeks. He had one reported mechanical fall last week while crossing the street where he fell on his back side. He says his back pain has been progressively worsening since this fall w/ a peak in his pain this AM. Pt is now s/p L4-S1 laminectomy.

## 2019-04-18 NOTE — DISCHARGE NOTE PROVIDER - CARE PROVIDER_API CALL
Jim Chaves)  Orthopedics  [address]  [place] [address]  Phone: [phone]  Fax: [phone]  Follow Up Time:

## 2019-04-18 NOTE — DISCHARGE NOTE NURSING/CASE MANAGEMENT/SOCIAL WORK - NSDCDPATPORTLINK_GEN_ALL_CORE
You can access the [facility] Patient Portal, offered by [facility], by registering with the following website: [email or web address]

## 2019-04-18 NOTE — PROGRESS NOTE ADULT - SUBJECTIVE AND OBJECTIVE BOX
ORTHO  Patient is a 63y old  Male who presents with a chief complaint of   Pt. resting without complaint    VS-  T(C): 36.9 (04-18-19 @ 04:43), Max: 36.9 (04-17-19 @ 13:13)  HR: 95 (04-18-19 @ 04:43) (84 - 97)  BP: 107/66 (04-18-19 @ 04:43) (107/66 - 153/89)  RR: 18 (04-18-19 @ 04:43) (14 - 20)  SpO2: 94% (04-18-19 @ 04:43) (94% - 100%)  Wt(kg): --    M.S. A&O  Lower back dressing- C/D/I  Neuro-              Motor- (+) Ankle & EHL 5+/5              Sensation- grossly intact to light touch              Calves- soft, nontender- PAS                               14.8   7.8   )-----------( 214      ( 17 Apr 2019 21:07 )             44.1     04-17    131<L>  |  100  |  24<H>  ----------------------------<  264<H>  4.7   |  19<L>  |  0.98    Ca    9.8      17 Apr 2019 20:55

## 2019-04-18 NOTE — DISCHARGE NOTE PROVIDER - NSDCACTIVITY_GEN_ALL_CORE
Walking - Outdoors allowed/Do not drive or operate machinery/Do not make important decisions/No heavy lifting/straining/Stairs allowed/Walking - Indoors allowed

## 2019-04-18 NOTE — PHYSICAL THERAPY INITIAL EVALUATION ADULT - ADDITIONAL COMMENTS
PTA pt required assist from family in recent weeks due to pain, prior to that he was independent. Pt lives in a  with 3 steps to enter +Yasir HR, unable to reach both, pt lives with wife. pt owns RW

## 2019-04-25 ENCOUNTER — APPOINTMENT (OUTPATIENT)
Dept: ORTHOPEDIC SURGERY | Facility: CLINIC | Age: 64
End: 2019-04-25
Payer: MEDICAID

## 2019-04-25 PROCEDURE — 99024 POSTOP FOLLOW-UP VISIT: CPT

## 2019-04-25 RX ORDER — OXYCODONE 5 MG/1
5 TABLET ORAL EVERY 8 HOURS
Qty: 90 | Refills: 0 | Status: ACTIVE | COMMUNITY
Start: 2019-04-25 | End: 1900-01-01

## 2019-04-25 NOTE — HISTORY OF PRESENT ILLNESS
[___ Days Post Op] : post op day #[unfilled] [2] : the patient reports pain that is 2/10 in severity [Clean/Dry/Intact] : clean, dry and intact [Neuro Intact] : an unremarkable neurological exam [Excellent Pain Control] : has excellent pain control [No Sign of Infection] : is showing no signs of infection [Doing Well] : is doing well [No ADLs] : to avoid most activities of daily living [No Work] : not to work [No Housework] : not to do housework [Chills] : no chills [Fever] : no fever [de-identified] : s/p L4-S1 posterior lumbar laminectomy on 04/17/19 [de-identified] : Pt sttes he has incisional pain, radiates to posterior aspect LLE to feet. numbness on left sole and lateral aspect of left foot. Pt takes Oxycodone 5mg. Uses walker for ambulation. Pt states symptoms improved vs presurgical symptoms [de-identified] : PT, oxycodone renewal. RTO 4 weeks.

## 2019-05-06 LAB — SURGICAL PATHOLOGY STUDY: SIGNIFICANT CHANGE UP

## 2019-05-20 ENCOUNTER — OTHER (OUTPATIENT)
Age: 64
End: 2019-05-20

## 2019-05-23 ENCOUNTER — APPOINTMENT (OUTPATIENT)
Dept: ORTHOPEDIC SURGERY | Facility: CLINIC | Age: 64
End: 2019-05-23
Payer: MEDICAID

## 2019-05-23 PROCEDURE — 99024 POSTOP FOLLOW-UP VISIT: CPT

## 2019-07-05 ENCOUNTER — APPOINTMENT (OUTPATIENT)
Dept: ORTHOPEDIC SURGERY | Facility: CLINIC | Age: 64
End: 2019-07-05
Payer: MEDICAID

## 2019-07-05 DIAGNOSIS — M51.26 OTHER INTERVERTEBRAL DISC DISPLACEMENT, LUMBAR REGION: ICD-10-CM

## 2019-07-05 PROCEDURE — 99024 POSTOP FOLLOW-UP VISIT: CPT

## 2019-10-08 ENCOUNTER — APPOINTMENT (OUTPATIENT)
Dept: ORTHOPEDIC SURGERY | Facility: CLINIC | Age: 64
End: 2019-10-08
Payer: MEDICAID

## 2019-10-08 DIAGNOSIS — M48.061 SPINAL STENOSIS, LUMBAR REGION WITHOUT NEUROGENIC CLAUDICATION: ICD-10-CM

## 2019-10-08 PROCEDURE — 99214 OFFICE O/P EST MOD 30 MIN: CPT

## 2019-10-31 PROCEDURE — 88311 DECALCIFY TISSUE: CPT

## 2019-10-31 PROCEDURE — 88304 TISSUE EXAM BY PATHOLOGIST: CPT

## 2019-10-31 PROCEDURE — 80048 BASIC METABOLIC PNL TOTAL CA: CPT

## 2019-10-31 PROCEDURE — C1889: CPT

## 2019-10-31 PROCEDURE — 90686 IIV4 VACC NO PRSV 0.5 ML IM: CPT

## 2019-10-31 PROCEDURE — 72020 X-RAY EXAM OF SPINE 1 VIEW: CPT

## 2019-10-31 PROCEDURE — 85027 COMPLETE CBC AUTOMATED: CPT

## 2019-10-31 PROCEDURE — 82962 GLUCOSE BLOOD TEST: CPT

## 2019-10-31 PROCEDURE — 97161 PT EVAL LOW COMPLEX 20 MIN: CPT

## 2019-12-10 NOTE — ED PROVIDER NOTE - NSTIMEPROVIDERCAREINITIATE_GEN_ER
Patient alert and oriented x4, forgetful at times. Bed alarm on for safety. Pt resting comfortably with complaints of some discomfort, but no needs for PRN pain medication. Abdomen is distended, but bowel sounds present.  Left paracentesis site draining sma 23-Mar-2019 13:49

## 2019-12-26 NOTE — PACU DISCHARGE NOTE - PAIN:
Continue Xarelto
Continue allopurinol
Lab Results   Component Value Date    HGBA1C 7 0 (H) 12/16/2019    Continue low carb diet and weight loss
Controlled with current regime

## 2020-04-02 PROBLEM — G83.4 CAUDA EQUINA COMPRESSION: Status: ACTIVE | Noted: 2019-04-09

## 2021-01-28 ENCOUNTER — RESULT REVIEW (OUTPATIENT)
Age: 66
End: 2021-01-28

## 2021-10-31 ENCOUNTER — APPOINTMENT (OUTPATIENT)
Dept: DISASTER EMERGENCY | Facility: CLINIC | Age: 66
End: 2021-10-31

## 2021-10-31 DIAGNOSIS — Z01.818 ENCOUNTER FOR OTHER PREPROCEDURAL EXAMINATION: ICD-10-CM

## 2021-11-01 LAB — SARS-COV-2 N GENE NPH QL NAA+PROBE: NOT DETECTED

## 2021-11-03 ENCOUNTER — OUTPATIENT (OUTPATIENT)
Dept: OUTPATIENT SERVICES | Facility: HOSPITAL | Age: 66
LOS: 1 days | End: 2021-11-03
Payer: MEDICARE

## 2021-11-03 VITALS
RESPIRATION RATE: 14 BRPM | SYSTOLIC BLOOD PRESSURE: 164 MMHG | WEIGHT: 218.92 LBS | DIASTOLIC BLOOD PRESSURE: 80 MMHG | HEART RATE: 73 BPM | HEIGHT: 72 IN

## 2021-11-03 VITALS
RESPIRATION RATE: 15 BRPM | OXYGEN SATURATION: 96 % | DIASTOLIC BLOOD PRESSURE: 80 MMHG | SYSTOLIC BLOOD PRESSURE: 159 MMHG | HEART RATE: 77 BPM

## 2021-11-03 DIAGNOSIS — Z98.890 OTHER SPECIFIED POSTPROCEDURAL STATES: Chronic | ICD-10-CM

## 2021-11-03 DIAGNOSIS — R94.39 ABNORMAL RESULT OF OTHER CARDIOVASCULAR FUNCTION STUDY: ICD-10-CM

## 2021-11-03 LAB
ANION GAP SERPL CALC-SCNC: 11 MMOL/L — SIGNIFICANT CHANGE UP (ref 5–17)
BUN SERPL-MCNC: 13 MG/DL — SIGNIFICANT CHANGE UP (ref 7–23)
CALCIUM SERPL-MCNC: 10.8 MG/DL — HIGH (ref 8.4–10.5)
CHLORIDE SERPL-SCNC: 99 MMOL/L — SIGNIFICANT CHANGE UP (ref 96–108)
CO2 SERPL-SCNC: 25 MMOL/L — SIGNIFICANT CHANGE UP (ref 22–31)
CREAT SERPL-MCNC: 0.95 MG/DL — SIGNIFICANT CHANGE UP (ref 0.5–1.3)
GLUCOSE BLDC GLUCOMTR-MCNC: 194 MG/DL — HIGH (ref 70–99)
GLUCOSE SERPL-MCNC: 193 MG/DL — HIGH (ref 70–99)
HCT VFR BLD CALC: 46.1 % — SIGNIFICANT CHANGE UP (ref 39–50)
HGB BLD-MCNC: 14.6 G/DL — SIGNIFICANT CHANGE UP (ref 13–17)
MCHC RBC-ENTMCNC: 29.4 PG — SIGNIFICANT CHANGE UP (ref 27–34)
MCHC RBC-ENTMCNC: 31.7 GM/DL — LOW (ref 32–36)
MCV RBC AUTO: 92.9 FL — SIGNIFICANT CHANGE UP (ref 80–100)
NRBC # BLD: 0 /100 WBCS — SIGNIFICANT CHANGE UP (ref 0–0)
PLATELET # BLD AUTO: 248 K/UL — SIGNIFICANT CHANGE UP (ref 150–400)
POTASSIUM SERPL-MCNC: 4.4 MMOL/L — SIGNIFICANT CHANGE UP (ref 3.5–5.3)
POTASSIUM SERPL-SCNC: 4.4 MMOL/L — SIGNIFICANT CHANGE UP (ref 3.5–5.3)
RBC # BLD: 4.96 M/UL — SIGNIFICANT CHANGE UP (ref 4.2–5.8)
RBC # FLD: 12.9 % — SIGNIFICANT CHANGE UP (ref 10.3–14.5)
SODIUM SERPL-SCNC: 135 MMOL/L — SIGNIFICANT CHANGE UP (ref 135–145)
WBC # BLD: 6.25 K/UL — SIGNIFICANT CHANGE UP (ref 3.8–10.5)
WBC # FLD AUTO: 6.25 K/UL — SIGNIFICANT CHANGE UP (ref 3.8–10.5)

## 2021-11-03 PROCEDURE — 93010 ELECTROCARDIOGRAM REPORT: CPT

## 2021-11-03 PROCEDURE — 93458 L HRT ARTERY/VENTRICLE ANGIO: CPT | Mod: 26

## 2021-11-03 PROCEDURE — 82962 GLUCOSE BLOOD TEST: CPT

## 2021-11-03 PROCEDURE — 99152 MOD SED SAME PHYS/QHP 5/>YRS: CPT

## 2021-11-03 PROCEDURE — 85027 COMPLETE CBC AUTOMATED: CPT

## 2021-11-03 PROCEDURE — 80048 BASIC METABOLIC PNL TOTAL CA: CPT

## 2021-11-03 PROCEDURE — 93005 ELECTROCARDIOGRAM TRACING: CPT

## 2021-11-03 PROCEDURE — 93458 L HRT ARTERY/VENTRICLE ANGIO: CPT

## 2021-11-03 PROCEDURE — 36415 COLL VENOUS BLD VENIPUNCTURE: CPT

## 2021-11-03 RX ORDER — ACETAMINOPHEN 500 MG
1000 TABLET ORAL ONCE
Refills: 0 | Status: DISCONTINUED | OUTPATIENT
Start: 2021-11-03 | End: 2021-11-17

## 2021-11-03 RX ORDER — FENOFIBRATE,MICRONIZED 130 MG
1 CAPSULE ORAL
Qty: 0 | Refills: 0 | DISCHARGE

## 2021-11-03 RX ORDER — GLYBURIDE-METFORMIN HYDROCHLORIDE 1.25; 25 MG/1; MG/1
1 TABLET ORAL
Qty: 0 | Refills: 0 | DISCHARGE

## 2021-11-03 NOTE — ASU DISCHARGE PLAN (ADULT/PEDIATRIC) - ASU DC SPECIAL INSTRUCTIONSFT
Wound Care:   The day AFTER your procedure remove bandage GENTLY, and clean using  mild soap and gentle warm, water stream, pat dry. leave OPEN to air.  YOU MAY SHOWER.   DO NOT apply lotions, creams, ointments, powder, perfumes to your incision site  DO NOT SOAK your site for 1 week ( no baths, no pools, no tubs, etc...)  Check  your groin and /or wrist daily. A small amount of bruising, and soreness are normal    ACTIVITY: for 24 hours   - DO NOT DRIVE  - DO NOT make any important decisions or sign legal documents   - DO NOT operate heavy machineries   - you may resume sexual activity in 48 hours, unless otherwise instructed by your cardiologist     If your procedure was done through the WRIST: for the NEXT 3DAYS:  - avoid pushing, pulling, with that affected wrist   - avoid repeated movement of that hand and wrist ( eg: typing, hammering)  - DO NOT LIFT anything more than 5 lbs     If your procedure was done through the GROIN: for the NEXT 5 DAYS  - Limit climbing stairs, DO NOT soak in bathtub or pool  - no strenuous activities, pushing, pulling, straining  - Do not lift anything 10lbs or heavier     MEDICATION:   take your medications as explained ( see discharge paperwork)   If you received a STENT, you will be taking antiplatelet medications to KEEP YOUR STENT OPEN ( eg: Aspirin, Plavix, Brilinta, Effient, etc).  Take as prescribed DO NOT STOP taking them without consulting with your cardiologist first.     Follow heart healthy diet recommended by your doctor, if you smoke STOP SMOKING ( may call [phone] for center of tobacco control if you need assistance)     CALL your doctor to make appointment in 2 WEEKS     ***CALL YOUR DOCTOR***  if you experience: fever, chills, body aches, or severe pain, swelling, redness, heat or yellow discharge at incision site  If you experience Bleeding or excruciating pain at the procedural site, swelling ( golf ball size) at your procedural site  If you experience CHEST PAIN  If you experience extremity numbness, tingling, temperature change ( of your procedural site)   If you are unable to reach your doctor, you may contact:   -Cardiology Office at [facility] at [phone] or   - [facility] [phone]  - [facility] [phone]

## 2021-11-03 NOTE — ASU DISCHARGE PLAN (ADULT/PEDIATRIC) - CARE PROVIDER_API CALL
Shaik SHANNON Kauffman)  Cardiology; Nuclear Medicine  [address] [place] [address]  Phone: [phone]  Fax: [phone]  Follow Up Time: 2 weeks

## 2021-11-03 NOTE — H&P CARDIOLOGY - HISTORY OF PRESENT ILLNESS
65 y/o Sukumar speaking ( ID #[id number]) male PMH HTN, HLD, DM2, with c/o mild exertional dyspnea. Denies chest pain. Pt seen and evaluated by cardiologist, Dr. MARI Kauffman, and had an abnormal nuclear stress test on 10/16/21 revealing moderate inferior ischemia; EF 58%. Pt referred for LHC today.

## 2021-11-03 NOTE — ASU PATIENT PROFILE, ADULT - NSICDXPASTMEDICALHX_GEN_ALL_CORE_FT
PAST MEDICAL HISTORY:  Back pain h/o right sciatica, now with left lumbar radiculopathy    Diabetes 2, last finger stick for last 2 days 165-170's    GERD (gastroesophageal reflux disease)     HTN (hypertension)     RBBB

## 2021-11-03 NOTE — H&P CARDIOLOGY - NSICDXPASTMEDICALHX_GEN_ALL_CORE_FT
PAST MEDICAL HISTORY:  Back pain h/o right sciatica, now with left lumbar radiculopathy    Diabetes     GERD (gastroesophageal reflux disease)     HTN (hypertension)     RBBB

## 2022-04-19 NOTE — H&P ADULT - PROBLEM SELECTOR PROBLEM 5
Conjuntivae and eyelids appear normal,  Sclerae : White without injection Need for prophylactic measure Medication management Hypercalcemia

## 2022-05-09 NOTE — PATIENT PROFILE ADULT - DO YOU FEEL LIKE HURTING YOURSELF OR OTHERS?
Will increase etodolac to 500 mg BID for better pain management.  Pt unable to do physical therapy due to financial cost. no

## 2022-05-17 NOTE — ASU PATIENT PROFILE, ADULT - MEDICATION ADMINISTRATION INFO, PROFILE
May 17, 2022      Wil Arshad  [address] [address]        To Whom It May Concern:    Wil Arshad  was seen on 2022. He was diagnosed with an ear infection. This would account for the fever. No other sign of illness noted on exam.  Sincerely,        KEISHA Ng    
no concerns

## 2022-10-13 NOTE — ED PROVIDER NOTE - PRINCIPAL DIAGNOSIS
Patient Education   Personalized Prevention Plan  You are due for the preventive services outlined below.  Your care team is available to assist you in scheduling these services.  If you have already completed any of these items, please share that information with your care team to update in your medical record.  Health Maintenance Due   Topic Date Due     Hepatitis B Vaccine (1 of 3 - 3-dose series) Never done     Colorectal Cancer Screening  Never done     Pneumococcal Vaccine (1 - PCV) Never done     AORTIC ANEURYSM SCREENING (SYSTEM ASSIGNED)  Never done     COVID-19 Vaccine (4 - Booster for Moderna series) 01/17/2022     ANNUAL REVIEW OF HM ORDERS  06/02/2022     Flu Vaccine (1) 09/01/2022        Patient Education   Personalized Prevention Plan  You are due for the preventive services outlined below.  Your care team is available to assist you in scheduling these services.  If you have already completed any of these items, please share that information with your care team to update in your medical record.  Health Maintenance Due   Topic Date Due     Hepatitis B Vaccine (1 of 3 - 3-dose series) Never done     Colorectal Cancer Screening  Never done     Pneumococcal Vaccine (1 - PCV) Never done     AORTIC ANEURYSM SCREENING (SYSTEM ASSIGNED)  Never done     COVID-19 Vaccine (4 - Booster for Moderna series) 01/17/2022     ANNUAL REVIEW OF HM ORDERS  06/02/2022     Flu Vaccine (1) 09/01/2022         Back pain

## 2023-02-17 NOTE — H&P PST ADULT - LAST CARDIAC ANGIOGRAM/IMAGING
Addended by: KULWINDER HILARIO on: 2/17/2023 10:35 AM     Modules accepted: Orders    
2016- was fine"